# Patient Record
Sex: FEMALE | Race: WHITE | URBAN - METROPOLITAN AREA
[De-identification: names, ages, dates, MRNs, and addresses within clinical notes are randomized per-mention and may not be internally consistent; named-entity substitution may affect disease eponyms.]

---

## 2022-03-22 ENCOUNTER — OFFICE VISIT (OUTPATIENT)
Dept: OBGYN CLINIC | Facility: CLINIC | Age: 37
End: 2022-03-22
Payer: COMMERCIAL

## 2022-03-22 VITALS
WEIGHT: 293 LBS | RESPIRATION RATE: 16 BRPM | BODY MASS INDEX: 48.82 KG/M2 | DIASTOLIC BLOOD PRESSURE: 70 MMHG | SYSTOLIC BLOOD PRESSURE: 124 MMHG | HEIGHT: 65 IN | HEART RATE: 78 BPM

## 2022-03-22 DIAGNOSIS — Z01.419 WELL WOMAN EXAM WITH ROUTINE GYNECOLOGICAL EXAM: Primary | ICD-10-CM

## 2022-03-22 DIAGNOSIS — Z12.4 CERVICAL CANCER SCREENING: ICD-10-CM

## 2022-03-22 PROCEDURE — 3078F DIAST BP <80 MM HG: CPT | Performed by: NURSE PRACTITIONER

## 2022-03-22 PROCEDURE — 3008F BODY MASS INDEX DOCD: CPT | Performed by: NURSE PRACTITIONER

## 2022-03-22 PROCEDURE — 99385 PREV VISIT NEW AGE 18-39: CPT | Performed by: NURSE PRACTITIONER

## 2022-03-22 PROCEDURE — 87624 HPV HI-RISK TYP POOLED RSLT: CPT | Performed by: NURSE PRACTITIONER

## 2022-03-22 PROCEDURE — 3074F SYST BP LT 130 MM HG: CPT | Performed by: NURSE PRACTITIONER

## 2022-03-22 RX ORDER — MULTIVIT-MIN/IRON FUM/FOLIC AC 7.5 MG-4
1 TABLET ORAL DAILY
COMMUNITY

## 2022-03-22 RX ORDER — ALBUTEROL SULFATE 90 UG/1
2 AEROSOL, METERED RESPIRATORY (INHALATION) EVERY 6 HOURS PRN
COMMUNITY

## 2022-04-04 LAB — HPV I/H RISK 1 DNA SPEC QL NAA+PROBE: NEGATIVE

## 2023-04-03 ENCOUNTER — OFFICE VISIT (OUTPATIENT)
Dept: FAMILY MEDICINE CLINIC | Facility: CLINIC | Age: 38
End: 2023-04-03
Payer: COMMERCIAL

## 2023-04-03 ENCOUNTER — LAB ENCOUNTER (OUTPATIENT)
Dept: LAB | Age: 38
End: 2023-04-03
Attending: FAMILY MEDICINE
Payer: COMMERCIAL

## 2023-04-03 ENCOUNTER — HOSPITAL ENCOUNTER (OUTPATIENT)
Dept: ULTRASOUND IMAGING | Age: 38
Discharge: HOME | End: 2023-04-03
Attending: FAMILY MEDICINE
Payer: COMMERCIAL

## 2023-04-03 VITALS
OXYGEN SATURATION: 98 % | RESPIRATION RATE: 16 BRPM | HEIGHT: 65 IN | DIASTOLIC BLOOD PRESSURE: 100 MMHG | BODY MASS INDEX: 48.82 KG/M2 | HEART RATE: 83 BPM | WEIGHT: 293 LBS | TEMPERATURE: 98 F | SYSTOLIC BLOOD PRESSURE: 132 MMHG

## 2023-04-03 DIAGNOSIS — Z00.00 WELLNESS EXAMINATION: ICD-10-CM

## 2023-04-03 DIAGNOSIS — M79.89 LEFT LEG SWELLING: ICD-10-CM

## 2023-04-03 DIAGNOSIS — Z00.00 LABORATORY EXAMINATION ORDERED AS PART OF A COMPLETE PHYSICAL EXAMINATION: ICD-10-CM

## 2023-04-03 DIAGNOSIS — Z00.00 WELLNESS EXAMINATION: Primary | ICD-10-CM

## 2023-04-03 DIAGNOSIS — R03.0 FINDING OF ABOVE NORMAL BLOOD PRESSURE: ICD-10-CM

## 2023-04-03 LAB
ALBUMIN SERPL-MCNC: 3.8 G/DL (ref 3.4–5)
ALBUMIN/GLOB SERPL: 1 {RATIO} (ref 1–2)
ALP LIVER SERPL-CCNC: 59 U/L
ALT SERPL-CCNC: 24 U/L
ANION GAP SERPL CALC-SCNC: 3 MMOL/L (ref 0–18)
AST SERPL-CCNC: 18 U/L (ref 15–37)
BASOPHILS # BLD AUTO: 0.06 X10(3) UL (ref 0–0.2)
BASOPHILS NFR BLD AUTO: 0.8 %
BILIRUB SERPL-MCNC: 0.5 MG/DL (ref 0.1–2)
BUN BLD-MCNC: 13 MG/DL (ref 7–18)
CALCIUM BLD-MCNC: 9.5 MG/DL (ref 8.5–10.1)
CHLORIDE SERPL-SCNC: 107 MMOL/L (ref 98–112)
CHOLEST SERPL-MCNC: 183 MG/DL (ref ?–200)
CO2 SERPL-SCNC: 28 MMOL/L (ref 21–32)
CREAT BLD-MCNC: 1.06 MG/DL
DEPRECATED HBV CORE AB SER IA-ACNC: 88.5 NG/ML
EOSINOPHIL # BLD AUTO: 0.83 X10(3) UL (ref 0–0.7)
EOSINOPHIL NFR BLD AUTO: 11.2 %
ERYTHROCYTE [DISTWIDTH] IN BLOOD BY AUTOMATED COUNT: 13 %
EST. AVERAGE GLUCOSE BLD GHB EST-MCNC: 111 MG/DL (ref 68–126)
FASTING PATIENT LIPID ANSWER: YES
FASTING STATUS PATIENT QL REPORTED: YES
GFR SERPLBLD BASED ON 1.73 SQ M-ARVRAT: 69 ML/MIN/1.73M2 (ref 60–?)
GLOBULIN PLAS-MCNC: 3.8 G/DL (ref 2.8–4.4)
GLUCOSE BLD-MCNC: 86 MG/DL (ref 70–99)
HBA1C MFR BLD: 5.5 % (ref ?–5.7)
HCT VFR BLD AUTO: 43.7 %
HDLC SERPL-MCNC: 41 MG/DL (ref 40–59)
HGB BLD-MCNC: 14.4 G/DL
IMM GRANULOCYTES # BLD AUTO: 0.02 X10(3) UL (ref 0–1)
IMM GRANULOCYTES NFR BLD: 0.3 %
IRON SATN MFR SERPL: 26 %
IRON SERPL-MCNC: 107 UG/DL
LDLC SERPL CALC-MCNC: 114 MG/DL (ref ?–100)
LYMPHOCYTES # BLD AUTO: 2.87 X10(3) UL (ref 1–4)
LYMPHOCYTES NFR BLD AUTO: 38.9 %
MCH RBC QN AUTO: 28.5 PG (ref 26–34)
MCHC RBC AUTO-ENTMCNC: 33 G/DL (ref 31–37)
MCV RBC AUTO: 86.5 FL
MONOCYTES # BLD AUTO: 0.5 X10(3) UL (ref 0.1–1)
MONOCYTES NFR BLD AUTO: 6.8 %
NEUTROPHILS # BLD AUTO: 3.1 X10 (3) UL (ref 1.5–7.7)
NEUTROPHILS # BLD AUTO: 3.1 X10(3) UL (ref 1.5–7.7)
NEUTROPHILS NFR BLD AUTO: 42 %
NONHDLC SERPL-MCNC: 142 MG/DL (ref ?–130)
OSMOLALITY SERPL CALC.SUM OF ELEC: 285 MOSM/KG (ref 275–295)
PLATELET # BLD AUTO: 298 10(3)UL (ref 150–450)
POTASSIUM SERPL-SCNC: 4.2 MMOL/L (ref 3.5–5.1)
PROT SERPL-MCNC: 7.6 G/DL (ref 6.4–8.2)
RBC # BLD AUTO: 5.05 X10(6)UL
SODIUM SERPL-SCNC: 138 MMOL/L (ref 136–145)
TIBC SERPL-MCNC: 411 UG/DL (ref 240–450)
TRANSFERRIN SERPL-MCNC: 276 MG/DL (ref 200–360)
TRIGL SERPL-MCNC: 157 MG/DL (ref 30–149)
TSI SER-ACNC: 2.59 MIU/ML (ref 0.36–3.74)
VIT B12 SERPL-MCNC: 314 PG/ML (ref 193–986)
VIT D+METAB SERPL-MCNC: 16.4 NG/ML (ref 30–100)
VLDLC SERPL CALC-MCNC: 27 MG/DL (ref 0–30)
WBC # BLD AUTO: 7.4 X10(3) UL (ref 4–11)

## 2023-04-03 PROCEDURE — 80061 LIPID PANEL: CPT

## 2023-04-03 PROCEDURE — 85025 COMPLETE CBC W/AUTO DIFF WBC: CPT

## 2023-04-03 PROCEDURE — 82728 ASSAY OF FERRITIN: CPT

## 2023-04-03 PROCEDURE — 93971 EXTREMITY STUDY: CPT | Performed by: FAMILY MEDICINE

## 2023-04-03 PROCEDURE — 83550 IRON BINDING TEST: CPT

## 2023-04-03 PROCEDURE — 82306 VITAMIN D 25 HYDROXY: CPT

## 2023-04-03 PROCEDURE — 83540 ASSAY OF IRON: CPT

## 2023-04-03 PROCEDURE — 83036 HEMOGLOBIN GLYCOSYLATED A1C: CPT

## 2023-04-03 PROCEDURE — 82607 VITAMIN B-12: CPT

## 2023-04-03 PROCEDURE — 80053 COMPREHEN METABOLIC PANEL: CPT

## 2023-04-03 PROCEDURE — 84443 ASSAY THYROID STIM HORMONE: CPT

## 2023-04-03 PROCEDURE — 36415 COLL VENOUS BLD VENIPUNCTURE: CPT

## 2023-04-05 ENCOUNTER — PATIENT MESSAGE (OUTPATIENT)
Dept: FAMILY MEDICINE CLINIC | Facility: CLINIC | Age: 38
End: 2023-04-05

## 2023-04-06 RX ORDER — MONTELUKAST SODIUM 10 MG/1
10 TABLET ORAL DAILY
Qty: 90 TABLET | Refills: 3 | Status: SHIPPED | OUTPATIENT
Start: 2023-04-06 | End: 2024-03-31

## 2023-04-17 ENCOUNTER — OFFICE VISIT (OUTPATIENT)
Dept: FAMILY MEDICINE CLINIC | Facility: CLINIC | Age: 38
End: 2023-04-17
Payer: COMMERCIAL

## 2023-04-17 VITALS
WEIGHT: 293 LBS | RESPIRATION RATE: 16 BRPM | DIASTOLIC BLOOD PRESSURE: 82 MMHG | HEIGHT: 65 IN | SYSTOLIC BLOOD PRESSURE: 130 MMHG | TEMPERATURE: 97 F | OXYGEN SATURATION: 99 % | HEART RATE: 77 BPM | BODY MASS INDEX: 48.82 KG/M2

## 2023-04-17 DIAGNOSIS — Z01.30 BLOOD PRESSURE CHECK: Primary | ICD-10-CM

## 2023-04-17 DIAGNOSIS — E55.9 VITAMIN D DEFICIENCY, UNSPECIFIED: ICD-10-CM

## 2023-04-17 DIAGNOSIS — E66.01 CLASS 3 SEVERE OBESITY WITHOUT SERIOUS COMORBIDITY WITH BODY MASS INDEX (BMI) OF 50.0 TO 59.9 IN ADULT, UNSPECIFIED OBESITY TYPE (HCC): ICD-10-CM

## 2023-04-17 PROBLEM — E66.813 CLASS 3 SEVERE OBESITY WITHOUT SERIOUS COMORBIDITY WITH BODY MASS INDEX (BMI) OF 50.0 TO 59.9 IN ADULT: Status: ACTIVE | Noted: 2023-04-17

## 2023-04-17 PROBLEM — E66.813 CLASS 3 SEVERE OBESITY WITHOUT SERIOUS COMORBIDITY WITH BODY MASS INDEX (BMI) OF 50.0 TO 59.9 IN ADULT (HCC): Status: ACTIVE | Noted: 2023-04-17

## 2023-04-17 PROCEDURE — 3079F DIAST BP 80-89 MM HG: CPT | Performed by: FAMILY MEDICINE

## 2023-04-17 PROCEDURE — 99213 OFFICE O/P EST LOW 20 MIN: CPT | Performed by: FAMILY MEDICINE

## 2023-04-17 PROCEDURE — 3008F BODY MASS INDEX DOCD: CPT | Performed by: FAMILY MEDICINE

## 2023-04-17 PROCEDURE — 3075F SYST BP GE 130 - 139MM HG: CPT | Performed by: FAMILY MEDICINE

## 2023-04-17 RX ORDER — ERGOCALCIFEROL 1.25 MG/1
50000 CAPSULE ORAL WEEKLY
Qty: 12 CAPSULE | Refills: 0 | Status: SHIPPED | OUTPATIENT
Start: 2023-04-17 | End: 2023-07-16

## 2023-05-03 ENCOUNTER — OFFICE VISIT (OUTPATIENT)
Dept: OBGYN CLINIC | Facility: CLINIC | Age: 38
End: 2023-05-03
Payer: COMMERCIAL

## 2023-05-03 ENCOUNTER — HOSPITAL ENCOUNTER (OUTPATIENT)
Dept: ULTRASOUND IMAGING | Age: 38
Discharge: HOME | End: 2023-05-03
Attending: NURSE PRACTITIONER
Payer: COMMERCIAL

## 2023-05-03 VITALS
DIASTOLIC BLOOD PRESSURE: 70 MMHG | HEART RATE: 87 BPM | RESPIRATION RATE: 18 BRPM | SYSTOLIC BLOOD PRESSURE: 128 MMHG | WEIGHT: 293 LBS | HEIGHT: 65 IN | BODY MASS INDEX: 48.82 KG/M2

## 2023-05-03 DIAGNOSIS — N91.5 OLIGOMENORRHEA, UNSPECIFIED TYPE: ICD-10-CM

## 2023-05-03 DIAGNOSIS — Z12.4 CERVICAL CANCER SCREENING: ICD-10-CM

## 2023-05-03 DIAGNOSIS — Z01.419 WELL WOMAN EXAM WITH ROUTINE GYNECOLOGICAL EXAM: Primary | ICD-10-CM

## 2023-05-03 PROCEDURE — 3078F DIAST BP <80 MM HG: CPT | Performed by: NURSE PRACTITIONER

## 2023-05-03 PROCEDURE — 3008F BODY MASS INDEX DOCD: CPT | Performed by: NURSE PRACTITIONER

## 2023-05-03 PROCEDURE — 88175 CYTOPATH C/V AUTO FLUID REDO: CPT | Performed by: NURSE PRACTITIONER

## 2023-05-03 PROCEDURE — 99395 PREV VISIT EST AGE 18-39: CPT | Performed by: NURSE PRACTITIONER

## 2023-05-03 PROCEDURE — 87624 HPV HI-RISK TYP POOLED RSLT: CPT | Performed by: NURSE PRACTITIONER

## 2023-05-03 PROCEDURE — 3074F SYST BP LT 130 MM HG: CPT | Performed by: NURSE PRACTITIONER

## 2023-05-03 RX ORDER — CHLORAL HYDRATE 500 MG
CAPSULE ORAL
COMMUNITY
Start: 2023-04-17

## 2023-05-03 RX ORDER — LORATADINE 10 MG/1
TABLET ORAL
COMMUNITY
Start: 2023-04-17

## 2023-05-04 ENCOUNTER — HOSPITAL ENCOUNTER (OUTPATIENT)
Dept: ULTRASOUND IMAGING | Age: 38
Discharge: HOME | End: 2023-05-04
Attending: NURSE PRACTITIONER
Payer: COMMERCIAL

## 2023-05-04 PROCEDURE — 76856 US EXAM PELVIC COMPLETE: CPT | Performed by: NURSE PRACTITIONER

## 2023-05-04 PROCEDURE — 76830 TRANSVAGINAL US NON-OB: CPT | Performed by: NURSE PRACTITIONER

## 2023-05-08 NOTE — PROGRESS NOTES
Contacted patient. Reviewed results and recommendations. Questions answered and patient states understanding.

## 2023-05-09 LAB — HPV I/H RISK 1 DNA SPEC QL NAA+PROBE: NEGATIVE

## 2023-06-26 ENCOUNTER — OFFICE VISIT (OUTPATIENT)
Dept: OBGYN CLINIC | Facility: CLINIC | Age: 38
End: 2023-06-26
Payer: COMMERCIAL

## 2023-06-26 VITALS
BODY MASS INDEX: 48.82 KG/M2 | DIASTOLIC BLOOD PRESSURE: 78 MMHG | SYSTOLIC BLOOD PRESSURE: 122 MMHG | WEIGHT: 293 LBS | HEART RATE: 101 BPM | HEIGHT: 65 IN

## 2023-06-26 DIAGNOSIS — N91.5 OLIGOMENORRHEA, UNSPECIFIED TYPE: ICD-10-CM

## 2023-06-26 DIAGNOSIS — N92.6 IRREGULAR MENSES: ICD-10-CM

## 2023-06-26 DIAGNOSIS — Z01.812 PRE-PROCEDURAL LABORATORY EXAMINATION: Primary | ICD-10-CM

## 2023-06-26 LAB
CONTROL LINE PRESENT WITH A CLEAR BACKGROUND (YES/NO): YES YES/NO
PREGNANCY TEST, URINE: NEGATIVE

## 2023-06-26 PROCEDURE — 88305 TISSUE EXAM BY PATHOLOGIST: CPT | Performed by: NURSE PRACTITIONER

## 2023-06-26 RX ORDER — ACETAMINOPHEN 160 MG
TABLET,DISINTEGRATING ORAL
COMMUNITY
Start: 2023-03-27

## 2023-06-26 NOTE — PROGRESS NOTES
S: Procedure:   Patient was consented for endometrial biopsy. Reviewed risk for infection, bleeding. All questions answered. No menses since April    O:  The speculum was placed and the cervix visualized. The cervix was cleaned with betadine. Tinaculum was placed to anterior lip of cervix. Uterus sounded to approxiamtly 7 cm. Scant to small tissue was obtained. Patient tolerated the procedure well. A/P:  1. Oligomenorrhea, unspecified type  - SURGICAL PATHOLOGY TISSUE; Future  - FSH; Future  - ESTRADIOL; Future  - PROLACTIN; Future    2. Pre-procedural laboratory examination  - URINE PREGNANCY TEST    3. Irregular menses  - FSH; Future  - ESTRADIOL;  Future  - PROLACTIN; Future

## 2023-06-26 NOTE — PATIENT INSTRUCTIONS
[facility] [place]  After Care Instructions for Endometrial Biopsy      Biopsy Results   You will receive a phone call with your biopsy results in 7 business days. If you have not received your results in 7 days, please contact our office. The results of your biopsy will determine if further treatment will be necessary. Bleeding   You may have some light bleeding or blackish clumpy discharge for several days after your biopsy. Restrictions    You should avoid intercourse or tampon use for 1 day after your biopsy. Pain    You may experience mild menstrual cramping after your biopsy. You may use Ibuprofen, Aleve or Tylenol to relieve your discomfort. If you experience severe or persistent pain contact our office. If you have any additional questions, please call us at [phone].

## 2023-06-27 ENCOUNTER — LAB ENCOUNTER (OUTPATIENT)
Dept: LAB | Age: 38
End: 2023-06-27
Attending: NURSE PRACTITIONER
Payer: COMMERCIAL

## 2023-06-27 DIAGNOSIS — N92.6 IRREGULAR MENSES: ICD-10-CM

## 2023-06-27 DIAGNOSIS — N91.5 OLIGOMENORRHEA, UNSPECIFIED TYPE: ICD-10-CM

## 2023-06-27 LAB
ESTRADIOL SERPL-MCNC: 19.8 PG/ML
FSH SERPL-ACNC: 46.9 MIU/ML
PROLACTIN SERPL-MCNC: 9.6 NG/ML

## 2023-06-27 PROCEDURE — 84146 ASSAY OF PROLACTIN: CPT

## 2023-06-27 PROCEDURE — 83001 ASSAY OF GONADOTROPIN (FSH): CPT

## 2023-06-27 PROCEDURE — 82670 ASSAY OF TOTAL ESTRADIOL: CPT

## 2023-06-27 PROCEDURE — 36415 COLL VENOUS BLD VENIPUNCTURE: CPT

## 2023-07-17 ENCOUNTER — OFFICE VISIT (OUTPATIENT)
Dept: FAMILY MEDICINE CLINIC | Facility: CLINIC | Age: 38
End: 2023-07-17
Payer: COMMERCIAL

## 2023-07-17 ENCOUNTER — LAB ENCOUNTER (OUTPATIENT)
Dept: LAB | Age: 38
End: 2023-07-17
Attending: FAMILY MEDICINE
Payer: COMMERCIAL

## 2023-07-17 VITALS
OXYGEN SATURATION: 98 % | SYSTOLIC BLOOD PRESSURE: 124 MMHG | BODY MASS INDEX: 48.82 KG/M2 | HEIGHT: 65 IN | WEIGHT: 293 LBS | TEMPERATURE: 97 F | RESPIRATION RATE: 16 BRPM | HEART RATE: 74 BPM | DIASTOLIC BLOOD PRESSURE: 78 MMHG

## 2023-07-17 DIAGNOSIS — R79.89 LOW VITAMIN D LEVEL: Primary | ICD-10-CM

## 2023-07-17 DIAGNOSIS — R79.89 LOW VITAMIN D LEVEL: ICD-10-CM

## 2023-07-17 LAB — VIT D+METAB SERPL-MCNC: 37.1 NG/ML (ref 30–100)

## 2023-07-17 PROCEDURE — 3074F SYST BP LT 130 MM HG: CPT | Performed by: FAMILY MEDICINE

## 2023-07-17 PROCEDURE — 3078F DIAST BP <80 MM HG: CPT | Performed by: FAMILY MEDICINE

## 2023-07-17 PROCEDURE — 36415 COLL VENOUS BLD VENIPUNCTURE: CPT

## 2023-07-17 PROCEDURE — 3008F BODY MASS INDEX DOCD: CPT | Performed by: FAMILY MEDICINE

## 2023-07-17 PROCEDURE — 82306 VITAMIN D 25 HYDROXY: CPT

## 2023-07-17 PROCEDURE — 99213 OFFICE O/P EST LOW 20 MIN: CPT | Performed by: FAMILY MEDICINE

## 2023-08-10 ENCOUNTER — OFFICE VISIT (OUTPATIENT)
Dept: INTERNAL MEDICINE CLINIC | Facility: CLINIC | Age: 38
End: 2023-08-10
Payer: COMMERCIAL

## 2023-08-10 VITALS
WEIGHT: 293 LBS | SYSTOLIC BLOOD PRESSURE: 134 MMHG | OXYGEN SATURATION: 96 % | RESPIRATION RATE: 18 BRPM | BODY MASS INDEX: 48.82 KG/M2 | HEART RATE: 83 BPM | HEIGHT: 65 IN | DIASTOLIC BLOOD PRESSURE: 80 MMHG

## 2023-08-10 DIAGNOSIS — E78.1 HYPERTRIGLYCERIDEMIA: ICD-10-CM

## 2023-08-10 DIAGNOSIS — E66.01 CLASS 3 SEVERE OBESITY WITH SERIOUS COMORBIDITY AND BODY MASS INDEX (BMI) OF 50.0 TO 59.9 IN ADULT, UNSPECIFIED OBESITY TYPE (HCC): ICD-10-CM

## 2023-08-10 DIAGNOSIS — E88.81 INSULIN RESISTANCE: ICD-10-CM

## 2023-08-10 DIAGNOSIS — E78.2 MIXED HYPERLIPIDEMIA: ICD-10-CM

## 2023-08-10 DIAGNOSIS — Z51.81 ENCOUNTER FOR THERAPEUTIC DRUG LEVEL MONITORING: Primary | ICD-10-CM

## 2023-08-10 PROCEDURE — 99204 OFFICE O/P NEW MOD 45 MIN: CPT | Performed by: PHYSICIAN ASSISTANT

## 2023-08-10 PROCEDURE — 3079F DIAST BP 80-89 MM HG: CPT | Performed by: PHYSICIAN ASSISTANT

## 2023-08-10 PROCEDURE — 3075F SYST BP GE 130 - 139MM HG: CPT | Performed by: PHYSICIAN ASSISTANT

## 2023-08-10 PROCEDURE — 3008F BODY MASS INDEX DOCD: CPT | Performed by: PHYSICIAN ASSISTANT

## 2023-08-10 RX ORDER — DEXAMETHASONE 4 MG/1
TABLET ORAL
COMMUNITY
Start: 2023-07-20

## 2023-12-06 ENCOUNTER — OFFICE VISIT (OUTPATIENT)
Dept: INTERNAL MEDICINE CLINIC | Facility: CLINIC | Age: 38
End: 2023-12-06
Payer: COMMERCIAL

## 2023-12-06 VITALS
SYSTOLIC BLOOD PRESSURE: 142 MMHG | WEIGHT: 293 LBS | RESPIRATION RATE: 18 BRPM | DIASTOLIC BLOOD PRESSURE: 90 MMHG | BODY MASS INDEX: 48.82 KG/M2 | HEIGHT: 65 IN | OXYGEN SATURATION: 95 % | HEART RATE: 82 BPM

## 2023-12-06 DIAGNOSIS — E66.01 CLASS 3 SEVERE OBESITY WITH SERIOUS COMORBIDITY AND BODY MASS INDEX (BMI) OF 50.0 TO 59.9 IN ADULT, UNSPECIFIED OBESITY TYPE (HCC): ICD-10-CM

## 2023-12-06 DIAGNOSIS — E88.819 INSULIN RESISTANCE: ICD-10-CM

## 2023-12-06 DIAGNOSIS — Z51.81 ENCOUNTER FOR THERAPEUTIC DRUG LEVEL MONITORING: Primary | ICD-10-CM

## 2023-12-06 DIAGNOSIS — E78.1 HYPERTRIGLYCERIDEMIA: ICD-10-CM

## 2023-12-06 DIAGNOSIS — E78.2 MIXED HYPERLIPIDEMIA: ICD-10-CM

## 2023-12-06 PROCEDURE — 99214 OFFICE O/P EST MOD 30 MIN: CPT | Performed by: PHYSICIAN ASSISTANT

## 2023-12-06 PROCEDURE — 3077F SYST BP >= 140 MM HG: CPT | Performed by: PHYSICIAN ASSISTANT

## 2023-12-06 PROCEDURE — 3080F DIAST BP >= 90 MM HG: CPT | Performed by: PHYSICIAN ASSISTANT

## 2023-12-06 PROCEDURE — 3008F BODY MASS INDEX DOCD: CPT | Performed by: PHYSICIAN ASSISTANT

## 2023-12-06 RX ORDER — TIRZEPATIDE 2.5 MG/.5ML
2.5 INJECTION, SOLUTION SUBCUTANEOUS WEEKLY
Qty: 2 ML | Refills: 0 | Status: SHIPPED | OUTPATIENT
Start: 2023-12-06

## 2023-12-06 RX ORDER — METFORMIN HYDROCHLORIDE 750 MG/1
750 TABLET, EXTENDED RELEASE ORAL DAILY
Qty: 90 TABLET | Refills: 1 | Status: SHIPPED | OUTPATIENT
Start: 2023-12-06

## 2023-12-10 ENCOUNTER — TELEPHONE (OUTPATIENT)
Dept: INTERNAL MEDICINE CLINIC | Facility: CLINIC | Age: 38
End: 2023-12-10

## 2023-12-11 NOTE — TELEPHONE ENCOUNTER
I TRIED TO ENTER   There was an error verifying eligibility - someone started pa and questions    I tried to renew and cannot

## 2023-12-12 NOTE — TELEPHONE ENCOUNTER
Pa entered in CMM for Zepbound 2.5 mg  Awaiting decision  TATYANA ZELAYA (Key: [id number])    Noted she has tried wegovy and cannot continue d/t shortages and cannot get saxenda d/t shortages  Never tried phentermine.

## 2023-12-14 NOTE — TELEPHONE ENCOUNTER
PA denied from [employer] for Zepbound stating patient must try alternatives.  I can do the notice regarding shortages from FDA (print) and attach - but she has never tried Qsymia or phentermine - is that contraindicated?        Fax received 12/13/23 at 8:03 am

## 2023-12-15 ENCOUNTER — PATIENT MESSAGE (OUTPATIENT)
Dept: INTERNAL MEDICINE CLINIC | Facility: CLINIC | Age: 38
End: 2023-12-15

## 2023-12-18 NOTE — TELEPHONE ENCOUNTER
From: Altagracia Washington  To: Jade Meneses  Sent: 12/15/2023 11:12 AM CST  Subject: Prior Authorization of Zepbound    Hello. My last appt with you was on Weds Dec 6 when I was prescribed Zepbound. I called the pharmacy on Monday and again today. They informed me that it is still awaiting prior authorization from the dr office. Is this something that can be looked into and given so that I can  this medication? Thank you for your help.

## 2024-01-01 NOTE — TELEPHONE ENCOUNTER
Reapplied for coverage of zepbound today on CMM  Noted she cannot take qsymia d/t high blood pressure and shortages of saxenda and wegovy notice from FDA attached  Awaiting decision    TATYANA ZELAYA (Key: [id number])

## 2024-01-11 ENCOUNTER — PATIENT MESSAGE (OUTPATIENT)
Dept: OBGYN CLINIC | Facility: CLINIC | Age: 39
End: 2024-01-11

## 2024-01-19 ENCOUNTER — LAB ENCOUNTER (OUTPATIENT)
Dept: LAB | Age: 39
End: 2024-01-19
Attending: NURSE PRACTITIONER
Payer: COMMERCIAL

## 2024-01-19 DIAGNOSIS — N91.5 OLIGOMENORRHEA, UNSPECIFIED TYPE: ICD-10-CM

## 2024-01-19 DIAGNOSIS — N92.6 IRREGULAR MENSES: ICD-10-CM

## 2024-01-19 LAB
ESTRADIOL SERPL-MCNC: 17.6 PG/ML
FSH SERPL-ACNC: 66.6 MIU/ML

## 2024-01-19 PROCEDURE — 83001 ASSAY OF GONADOTROPIN (FSH): CPT

## 2024-01-19 PROCEDURE — 36415 COLL VENOUS BLD VENIPUNCTURE: CPT

## 2024-01-19 PROCEDURE — 82670 ASSAY OF TOTAL ESTRADIOL: CPT

## 2024-01-31 ENCOUNTER — OFFICE VISIT (OUTPATIENT)
Dept: OBGYN CLINIC | Facility: CLINIC | Age: 39
End: 2024-01-31
Payer: COMMERCIAL

## 2024-01-31 VITALS
HEART RATE: 101 BPM | HEIGHT: 64 IN | BODY MASS INDEX: 50.02 KG/M2 | SYSTOLIC BLOOD PRESSURE: 130 MMHG | WEIGHT: 293 LBS | DIASTOLIC BLOOD PRESSURE: 82 MMHG

## 2024-01-31 DIAGNOSIS — E28.39 PRIMARY OVARIAN INSUFFICIENCY: Primary | ICD-10-CM

## 2024-01-31 PROCEDURE — 3075F SYST BP GE 130 - 139MM HG: CPT | Performed by: OBSTETRICS & GYNECOLOGY

## 2024-01-31 PROCEDURE — 3008F BODY MASS INDEX DOCD: CPT | Performed by: OBSTETRICS & GYNECOLOGY

## 2024-01-31 PROCEDURE — 99213 OFFICE O/P EST LOW 20 MIN: CPT | Performed by: OBSTETRICS & GYNECOLOGY

## 2024-01-31 PROCEDURE — 3079F DIAST BP 80-89 MM HG: CPT | Performed by: OBSTETRICS & GYNECOLOGY

## 2024-01-31 RX ORDER — LEVONORGESTREL AND ETHINYL ESTRADIOL 0.15-0.03
1 KIT ORAL DAILY
Qty: 91 TABLET | Refills: 4 | Status: SHIPPED | OUTPATIENT
Start: 2024-01-31 | End: 2025-01-30

## 2024-01-31 NOTE — PROGRESS NOTES
[facility]  Obstetrics and Gynecology    Subjective:     Soo Pollack is a 38 year old  who presents to review lab results, done because of irregular menstrual cycles, possible perimenopause.  She has not had a menstrual cycle in over a year - maybe was 2022.   Denies significant symptoms, has always been a \"hot sleeper\", maybe this has gotten a little worse. Denies hot flushes or genitourinary symptoms.     OB History    Para Term  AB Living   3 2 2   1 2   SAB IAB Ectopic Multiple Live Births   1       2      # Outcome Date GA Lbr Foster/2nd Weight Sex Delivery Anes PTL Lv   3 Term 18    F Caesarean   JOHN   2 Term 12    M Caesarean EPI  JOHN   1 SAB  10w0d    SAB   FD       Period Cycle (Days): does not have cycle (2024  8:10 AM)  Period Duration (Days): 3-5 (5/3/2023  8:53 AM)  Period Flow: light (5/3/2023  8:53 AM)  Use of Birth Control (if yes, specify type): None (2024  8:10 AM)  Hx Prior Abnormal Pap: No (2024  8:10 AM)  Pap Date: 23 (2024  8:10 AM)  Pap Result Notes: normal (2024  8:10 AM)       Objective:     Vitals:    24 0742   BP: 130/82   Pulse: 101   Weight: (!) 309 lb (140.2 kg)   Height: 64\"       Body mass index is 53.04 kg/m².    GEN: AAOx3, NAD, appears well, appears stated age  RESP: breathing comfortably      Labs:   Latest Reference Range & Units 23 08:39 23 08:28 24 11:51   TSH 0.358 - 3.740 mIU/mL 2.590     Prolactin 2.2 - 31.5 ng/mL  9.6    Estradiol No established range for female sex pg/mL  19.8 17.6   FSH No established range for female sex mIU/mL  46.9 66.6     Endometrial biopsy:  -Few fragments of weakly proliferative endometrium with blood.  -Negative for hyperplasia or malignancy.    Imaging:  Pelvic US 5/4/23  FINDINGS:                UTERUS:  10.61 cm x 3.99 cm x 5.37 cm     Endometrium Thickness: 0.75 cm     The uterus appears normal in size, shape, and echogenicity.    RIGHT OVARY:  Nonvisualization.    LEFT OVARY:  2.33 cm x 2.46 cm x 2.35 cm     The left ovary appears normal in size, shape, and echogenicity. No significant masses are identified.   CUL-DE-SAC:  Normal.  No fluid or mass.    OTHER:  Trace amount of fluid is noted in the cervix.  Small to both the insists seen.       Assessment:     Soo Pollack is a 38 year old  with secondary amenorrhea for over a year and labs consistent with menopause - suspect primary ovarian insufficiency/premature ovarian failure.     Plan:     Primary ovarian insufficiency/premature ovarian failure is when a woman reaches menopause prior to 41yo. Counseled that the cause of this can be from abnormal karyotype such as 45 XO (Velásquez Syndrome) or Fragile X mutation, recommend testing. She is at risk for c-morbidities related to menopause, which can be higher risk to her as it is starting early. Therefore she should have regular care with PCP monitor her cardiovascular and bone health.     Menopausal hormone therapy (MHT) can reduce the risk of osteoporosis and CV disease, as well as helping with vasomotor and genitourinary symptoms of menopause. Recommend combined OCP as this will provide contraception as well in the case that spontaneous ovarian activity resumes. Advised that she may experience \"periods\" from withdrawal bleeding from the OCP - will rx 90 day continuous use pill to limit bleeding.     -- Follow up for annual or sooner as needed    Total time was 25 minutes, more than 50% of the time was spent in face-to-face counseling and/or coordination of care.      Dana Pena MD  [facility]  2024 8:03 AM

## 2024-02-09 PROBLEM — E28.39 PRIMARY OVARIAN INSUFFICIENCY: Status: ACTIVE | Noted: 2024-02-09

## 2024-02-16 ENCOUNTER — PATIENT MESSAGE (OUTPATIENT)
Dept: INTERNAL MEDICINE CLINIC | Facility: CLINIC | Age: 39
End: 2024-02-16

## 2024-02-20 NOTE — TELEPHONE ENCOUNTER
From: Soo Pollack  To: Pippa Edgar  Sent: 2/16/2024 1:39 PM CST  Subject: Zepbound 5mg/0.5ml progress    Hello.     I have just had my third shot of the Zepbound 5mg/0.5ml last night. In a prior message you asked me to provide an update after the third dose. Things seem to be going well with it. There is definitely a difference with how full I feel now on this dosage as opposed to the first dose.     I only have one shot left. Should I continue on this dose? If so I believe I will still need a new prescription as it currently shows that I have 0 refills available. Thank you for your help.

## 2024-02-21 NOTE — TELEPHONE ENCOUNTER
Pt is requesting next dose of Zepbound. Has taken 3 weeks of 5.0mg dose without S/E. She feels good on it and states current weight is 299#.     Pended 7.5mg if agreeable.     Thank you

## 2024-02-27 RX ORDER — TIRZEPATIDE 7.5 MG/.5ML
7.5 INJECTION, SOLUTION SUBCUTANEOUS WEEKLY
Qty: 2 ML | Refills: 0 | Status: SHIPPED | OUTPATIENT
Start: 2024-02-27

## 2024-02-27 NOTE — TELEPHONE ENCOUNTER
Per Pippa anthony to order 7.5 mg zepbound for patient.    Future Appointments   Date Time Provider Department Center   3/7/2024 12:20 PM Pippa Edgar, PA-C [facility] [address]

## 2024-02-29 ENCOUNTER — PATIENT MESSAGE (OUTPATIENT)
Dept: OBGYN CLINIC | Facility: CLINIC | Age: 39
End: 2024-02-29

## 2024-03-01 NOTE — TELEPHONE ENCOUNTER
From: Soo Pollack  To: Dana Arguelles  Sent: 2/29/2024 1:57 PM CST  Subject: Period On Birth Control    Hello. I saw you last on Jan 31 and you started me on Levonor Estradiol for early menopause. You said that it may trigger me to start my period - which it did. My period started on Monday, February 19 and continues through today. It is not a heavy flow - but it is still present after 10 days and doesn’t appear to be lessening any. Is this a concern to you? When I was getting my period it was light and short - 3 to 4 days and done. This is a little worrying to me that I continue to bleed after this long. Please let me know if this is normal or if there is something that I need to do. Thank you.

## 2024-03-07 ENCOUNTER — OFFICE VISIT (OUTPATIENT)
Dept: INTERNAL MEDICINE CLINIC | Facility: CLINIC | Age: 39
End: 2024-03-07
Payer: COMMERCIAL

## 2024-03-07 VITALS
BODY MASS INDEX: 50.02 KG/M2 | OXYGEN SATURATION: 97 % | HEART RATE: 92 BPM | SYSTOLIC BLOOD PRESSURE: 118 MMHG | HEIGHT: 64 IN | RESPIRATION RATE: 16 BRPM | WEIGHT: 293 LBS | DIASTOLIC BLOOD PRESSURE: 70 MMHG

## 2024-03-07 DIAGNOSIS — E78.2 MIXED HYPERLIPIDEMIA: ICD-10-CM

## 2024-03-07 DIAGNOSIS — E55.9 VITAMIN D DEFICIENCY: ICD-10-CM

## 2024-03-07 DIAGNOSIS — Z51.81 ENCOUNTER FOR THERAPEUTIC DRUG LEVEL MONITORING: Primary | ICD-10-CM

## 2024-03-07 DIAGNOSIS — E66.01 CLASS 3 SEVERE OBESITY WITH SERIOUS COMORBIDITY AND BODY MASS INDEX (BMI) OF 50.0 TO 59.9 IN ADULT, UNSPECIFIED OBESITY TYPE (HCC): ICD-10-CM

## 2024-03-07 DIAGNOSIS — E88.819 INSULIN RESISTANCE: ICD-10-CM

## 2024-03-07 PROCEDURE — 3008F BODY MASS INDEX DOCD: CPT | Performed by: PHYSICIAN ASSISTANT

## 2024-03-07 PROCEDURE — 3074F SYST BP LT 130 MM HG: CPT | Performed by: PHYSICIAN ASSISTANT

## 2024-03-07 PROCEDURE — 3078F DIAST BP <80 MM HG: CPT | Performed by: PHYSICIAN ASSISTANT

## 2024-03-07 PROCEDURE — 99214 OFFICE O/P EST MOD 30 MIN: CPT | Performed by: PHYSICIAN ASSISTANT

## 2024-03-07 NOTE — PROGRESS NOTES
HISTORY OF PRESENT ILLNESS  Chief Complaint   Patient presents with    Weight Check     -20lbs       Soo Pollack is a 38 year old female here for follow up in medical weight loss program.   -20lbs  Compliant on zepbound and metformin  Denies chest pain, shortness of breath, dizziness, blurred vision, headache, paresthesia, nausea/vomiting.   Struggling a little more this week with getting protein in because of lack of appetite  Prior to this was doing well with protein  Feeling full sooner  Kids were sick and then her and  got sick  But now starting to get back into exercise, got a small trampoline basement    Nutrition: 24 hour food log reviewed, eating regular meals, +protein  Stress: 6/10  Sleep: 4 hours/night, child wakes her up once a night, quick to fall back asleep      Wt Readings from Last 6 Encounters:   03/07/24 293 lb (132.9 kg)   01/31/24 (!) 309 lb (140.2 kg)   12/06/23 (!) 313 lb (142 kg)   08/10/23 (!) 347 lb (157.4 kg)   07/17/23 (!) 351 lb (159.2 kg)   06/26/23 (!) 348 lb (157.9 kg)            Breakfast Lunch Dinner Snacks Fluids   Reviewed           REVIEW OF SYSTEMS  GENERAL HEALTH: feels well otherwise, denied any fevers chills or night sweats   RESPIRATORY: denies shortness of breath   CARDIOVASCULAR: denies chest pain  GI: denies abdominal pain    EXAM  /70   Pulse 92   Resp 16   Ht 5' 4\" (1.626 m)   Wt 293 lb (132.9 kg)   LMP 02/18/2024 (Approximate)   SpO2 97%   BMI 50.29 kg/m²   GENERAL: well developed, well nourished,in no apparent distress, A/O x3  SKIN: no rashes,no suspicious lesions  HEENT: atraumatic, normocephalic, OP-clear, PERRL  NECK: supple,no adenopathy  LUNGS: clear to auscultation bilaterally   CARDIO: RRR without murmur  GI: good BS's,NT/ND, no masses or HSM  EXTREMITIES: no cyanosis, no clubbing, no edema    Lab Results   Component Value Date    WBC 7.4 04/03/2023    RBC 5.05 04/03/2023    HGB 14.4 04/03/2023    HCT 43.7 04/03/2023    MCV 86.5  04/03/2023    MCH 28.5 04/03/2023    MCHC 33.0 04/03/2023    RDW 13.0 04/03/2023    .0 04/03/2023     Lab Results   Component Value Date    GLU 86 04/03/2023    BUN 13 04/03/2023    CREATSERUM 1.06 (H) 04/03/2023    ANIONGAP 3 04/03/2023    CA 9.5 04/03/2023    OSMOCALC 285 04/03/2023    ALKPHO 59 04/03/2023    AST 18 04/03/2023    ALT 24 04/03/2023    BILT 0.5 04/03/2023    TP 7.6 04/03/2023    ALB 3.8 04/03/2023    GLOBULIN 3.8 04/03/2023     04/03/2023    K 4.2 04/03/2023     04/03/2023    CO2 28.0 04/03/2023     Lab Results   Component Value Date     04/03/2023    A1C 5.5 04/03/2023     Lab Results   Component Value Date    CHOLEST 183 04/03/2023    TRIG 157 (H) 04/03/2023    HDL 41 04/03/2023     (H) 04/03/2023    VLDL 27 04/03/2023    NONHDLC 142 (H) 04/03/2023     Lab Results   Component Value Date    TSH 2.590 04/03/2023     Lab Results   Component Value Date    B12 314 04/03/2023     Lab Results   Component Value Date    VITD 37.1 07/17/2023       Current Outpatient Medications on File Prior to Visit   Medication Sig Dispense Refill    Tirzepatide-Weight Management (ZEPBOUND) 7.5 MG/0.5ML Subcutaneous Solution Auto-injector Inject 7.5 mg into the skin once a week. 2 mL 0    Levonorgest-Eth Estrad 91-Day 0.15-0.03 MG Oral Tab Take 1 tablet by mouth daily. 91 tablet 4    metFORMIN  MG Oral Tablet 24 Hr Take 1 tablet (750 mg total) by mouth daily. With a meal 90 tablet 1    Biotin w/ Vitamins C & E (HAIR/SKIN/NAILS OR) Take by mouth.      cholecalciferol 50 MCG (2000 UT) Oral Cap       loratadine (CLARITIN) 10 MG Oral Tab       omega-3 fatty acids 1000 MG Oral Cap       montelukast (SINGULAIR) 10 MG Oral Tab Take 1 tablet (10 mg total) by mouth daily. 90 tablet 3    Multiple Vitamins-Minerals (MULTI-VITAMIN/MINERALS) Oral Tab Take 1 tablet by mouth daily.      FLOVENT  MCG/ACT Inhalation Aerosol  (Patient not taking: Reported on 3/7/2024)      albuterol 108 (90  Base) MCG/ACT Inhalation Aero Soln Inhale 2 puffs into the lungs every 6 (six) hours as needed for Wheezing. (Patient not taking: Reported on 3/7/2024)       No current facility-administered medications on file prior to visit.       ASSESSMENT  Analyzed weight data:       Diagnoses and all orders for this visit:    Encounter for therapeutic drug level monitoring    Class 3 severe obesity with serious comorbidity and body mass index (BMI) of 50.0 to 59.9 in adult, unspecified obesity type (HCC)    Mixed hyperlipidemia    Insulin resistance    Vitamin D deficiency        PLAN  Initial Weight: 347 lbs  Initial Weight Date: 08/10/23  Today's Weight: 293 lbs  5% Goal: 17.35  10% Goal: 34.7  Total Weight Loss: -20lbs/Net loss 54 lbs    Will continue metformin and zepbound - advised side effects and adverse effects of medication   Insulin resistance - continue current medications, low carb diet  HLD - lifestyle changes  Vit D supplement, take with food  Consistency with logging foods - protein and produce  Increase exercise, incorporate strength/resistance training  Nutrition: low carb diet/ recommended to eat breakfast daily/ regular protein intake  Medication use and side effects reviewed with patient.  Medication contraindications: EKG prior to stimulant  Follow up with dietitian and psychologist as recommended.  Discussed the role of sleep and stress in weight management.  Counseled on comprehensive weight loss plan including attention to nutrition, exercise and behavior/stress management for success. See patient instruction below for more details.  Discussed strategies to overcome barriers to successful weight loss and weight maintenance  FITTE: ACSM recommendations (150-300 minutes/ week in active weight loss)   Total time spent on chart review, pre-charting, obtaining history, counseling, and educating, reviewing labs was 30 minutes.  Weight Loss consent to treat reviewed and signed       NOTE TO PATIENT: The 21st  Century Cures Act makes clinical notes like these available to patients in the interest of transparency. Clinical notes are medical documents used by physicians and care providers to communicate with each other. These documents include medical language and terminology, abbreviations, and treatment information that may sound technical and at times possibly unfamiliar. In addition, at times, the verbiage may appear blunt or direct. These documents are one tool providers use to communicate relevant information and clinical opinions of the care providers in a way that allows common understanding of the clinical context.     There are no Patient Instructions on file for this visit.    No follow-ups on file.    Patient verbalizes understanding.    Pippa Edgar PA-C  3/7/2024

## 2024-03-14 ENCOUNTER — PATIENT MESSAGE (OUTPATIENT)
Dept: INTERNAL MEDICINE CLINIC | Facility: CLINIC | Age: 39
End: 2024-03-14

## 2024-03-15 NOTE — TELEPHONE ENCOUNTER
From: Soo Pollack  To: Pippa Edgar  Sent: 3/14/2024 7:04 PM CDT  Subject: Zepbound 7.5    Hello. I had my appointment with Pippa last Thursday. I informed her that on the 7.5 dose I have very little appetite. While it is getting somewhat better I feel that I should stick on this dose for one more month. Can you please send another prescription for Zepbound 7.5 to the pharmacy? I currently have no refills available. Thank you.

## 2024-03-15 NOTE — TELEPHONE ENCOUNTER
Pt would like refill. Is asking to stay on 7.5mg dose for this time.     LOV 3/7/24  Future Appointments   Date Time Provider Department Center   5/7/2024 12:30 PM Dana Arguelles MD [facility] [address]   6/27/2024  2:40 PM Pippa Edgar PA-C [facility] [address]   Last Rx 2/27/24 #2mL + 0     Pended if agreeable.

## 2024-03-18 RX ORDER — TIRZEPATIDE 7.5 MG/.5ML
7.5 INJECTION, SOLUTION SUBCUTANEOUS WEEKLY
Qty: 2 ML | Refills: 0 | Status: SHIPPED | OUTPATIENT
Start: 2024-03-18

## 2024-03-26 ENCOUNTER — PATIENT MESSAGE (OUTPATIENT)
Dept: INTERNAL MEDICINE CLINIC | Facility: CLINIC | Age: 39
End: 2024-03-26

## 2024-03-26 NOTE — TELEPHONE ENCOUNTER
From: Soo Pollack  To: Pippa Edgar  Sent: 3/26/2024 12:02 PM CDT  Subject: Zepbound 7.5 mg Out Of Stock    Hello,     My current prescription goes to [facility] and I am being told that right now they are out of stock of the Zepbound 7.5 mg with a possible stock date of April sometime. I have called 5 other pharmacies in the area and they all say something similar - nothing in stock now and no stock date until possibly April. I currently have 0 shots to give myself as I took my last shot last Thursday. What should I do in the meantime until pharmacies are stocked again?     Thank you.

## 2024-03-27 RX ORDER — TIRZEPATIDE 10 MG/.5ML
10 INJECTION, SOLUTION SUBCUTANEOUS WEEKLY
Qty: 2 ML | Refills: 0 | Status: SHIPPED | OUTPATIENT
Start: 2024-03-27

## 2024-04-12 RX ORDER — TIRZEPATIDE 10 MG/.5ML
10 INJECTION, SOLUTION SUBCUTANEOUS WEEKLY
Qty: 2 ML | Refills: 0 | Status: CANCELLED | OUTPATIENT
Start: 2024-04-12

## 2024-04-12 NOTE — TELEPHONE ENCOUNTER
Patient has had some nausea on the 10mg that has mellowed out and is okay with going up in the dose if approved.    Requesting   Requested Prescriptions     Pending Prescriptions Disp Refills    Tirzepatide-Weight Management (ZEPBOUND) 12.5 MG/0.5ML Subcutaneous Solution Auto-injector 2 mL 0     Sig: Inject 12.5 mg into the skin once a week for 4 doses.      LOV: 3/7/24  RTC:   Last Relevant Labs:   Filled: 3/27/24 #2ml with 0 refills    Future Appointments   Date Time Provider Department Center   4/16/2024 12:40 PM Bryn Ennis APRN [facility] [address]   5/7/2024 12:30 PM Dana Arguelles MD [facility] [address]   6/27/2024  2:40 PM Pippa Edgar, ATA [facility] [address]

## 2024-04-14 RX ORDER — TIRZEPATIDE 12.5 MG/.5ML
12.5 INJECTION, SOLUTION SUBCUTANEOUS WEEKLY
Qty: 2 ML | Refills: 0 | Status: SHIPPED | OUTPATIENT
Start: 2024-04-14 | End: 2024-05-06

## 2024-04-16 ENCOUNTER — OFFICE VISIT (OUTPATIENT)
Dept: FAMILY MEDICINE CLINIC | Facility: CLINIC | Age: 39
End: 2024-04-16
Payer: COMMERCIAL

## 2024-04-16 VITALS
OXYGEN SATURATION: 99 % | SYSTOLIC BLOOD PRESSURE: 132 MMHG | HEART RATE: 87 BPM | HEIGHT: 64 IN | BODY MASS INDEX: 48.76 KG/M2 | TEMPERATURE: 98 F | RESPIRATION RATE: 18 BRPM | WEIGHT: 285.63 LBS | DIASTOLIC BLOOD PRESSURE: 82 MMHG

## 2024-04-16 DIAGNOSIS — J30.9 ALLERGIC RHINITIS, UNSPECIFIED SEASONALITY, UNSPECIFIED TRIGGER: ICD-10-CM

## 2024-04-16 DIAGNOSIS — R53.83 OTHER FATIGUE: ICD-10-CM

## 2024-04-16 DIAGNOSIS — E55.9 VITAMIN D DEFICIENCY, UNSPECIFIED: ICD-10-CM

## 2024-04-16 DIAGNOSIS — Z00.00 WELLNESS EXAMINATION: Primary | ICD-10-CM

## 2024-04-16 DIAGNOSIS — Z12.31 ENCOUNTER FOR SCREENING MAMMOGRAM FOR MALIGNANT NEOPLASM OF BREAST: ICD-10-CM

## 2024-04-16 DIAGNOSIS — Z00.00 LABORATORY EXAMINATION ORDERED AS PART OF A COMPLETE PHYSICAL EXAMINATION: ICD-10-CM

## 2024-04-16 PROCEDURE — 3008F BODY MASS INDEX DOCD: CPT | Performed by: FAMILY MEDICINE

## 2024-04-16 PROCEDURE — 96127 BRIEF EMOTIONAL/BEHAV ASSMT: CPT | Performed by: FAMILY MEDICINE

## 2024-04-16 PROCEDURE — 3079F DIAST BP 80-89 MM HG: CPT | Performed by: FAMILY MEDICINE

## 2024-04-16 PROCEDURE — 99396 PREV VISIT EST AGE 40-64: CPT | Performed by: FAMILY MEDICINE

## 2024-04-16 PROCEDURE — 3075F SYST BP GE 130 - 139MM HG: CPT | Performed by: FAMILY MEDICINE

## 2024-04-16 RX ORDER — MONTELUKAST SODIUM 10 MG/1
10 TABLET ORAL NIGHTLY
COMMUNITY

## 2024-04-16 NOTE — PROGRESS NOTES
Subjective:   Soo Pollack is a 38 year old female who presents for annual physical exam.      Patient to clinic for annual physical examination. Patient states that she is concerned about fatigue. For the last few months she has been very fatigued throughout the day. Even with daily exercise of 2 mile walk she feels very tired and has difficulty getting up in the morning to do her exercise. Patient would like to get lab work done for annual health screening.       Immunization: Patient states that she had her TDAP with her last pregnancy in 2018, does not remember where she received the vaccine. Does not wish to have covid or flu at this time. Patient stated \"yes I know I am late on those this year.\"  Mammo: Schedule for May, order placed.   PAP: UTD sees OB/GYN regularly for annual visits.   Colon: Not needed no family history of CRC at this time, not screening age.     History/Other:    No Further Nursing Notes to Review  Tobacco Reviewed  Allergies   Reviewed  Medications Reviewed  Problem List Reviewed  Medical History   Reviewed  Surgical History Reviewed  OB Status Reviewed  Family History   Reviewed  Social History Reviewed       I had reviewed past medical and family histories together with allergy and medication lists documented.  Tobacco:  She has never smoked tobacco.    Current Outpatient Medications   Medication Sig Dispense Refill    montelukast 10 MG Oral Tab Take 1 tablet (10 mg total) by mouth nightly.      Tirzepatide-Weight Management (ZEPBOUND) 10 MG/0.5ML Subcutaneous Solution Auto-injector Inject 10 mg into the skin once a week. 2 mL 0    Levonorgest-Eth Estrad 91-Day 0.15-0.03 MG Oral Tab Take 1 tablet by mouth daily. 91 tablet 4    metFORMIN  MG Oral Tablet 24 Hr Take 1 tablet (750 mg total) by mouth daily. With a meal 90 tablet 1    Biotin w/ Vitamins C & E (HAIR/SKIN/NAILS OR) Take by mouth.      cholecalciferol 50 MCG (2000 UT) Oral Cap       loratadine (CLARITIN) 10  MG Oral Tab       omega-3 fatty acids 1000 MG Oral Cap       Multiple Vitamins-Minerals (MULTI-VITAMIN/MINERALS) Oral Tab Take 1 tablet by mouth daily.      Tirzepatide-Weight Management (ZEPBOUND) 12.5 MG/0.5ML Subcutaneous Solution Auto-injector Inject 12.5 mg into the skin once a week for 4 doses. 2 mL 0    FLOVENT  MCG/ACT Inhalation Aerosol  (Patient not taking: Reported on 3/7/2024)      albuterol 108 (90 Base) MCG/ACT Inhalation Aero Soln Inhale 2 puffs into the lungs every 6 (six) hours as needed for Wheezing. (Patient not taking: Reported on 3/7/2024)           Review of Systems:  Review of Systems   Constitutional:  Positive for fatigue. Negative for activity change, appetite change, fever and unexpected weight change (actively loosing weight, with medication).   HENT: Negative.  Negative for postnasal drip and rhinorrhea.    Eyes:  Positive for redness and itching.   Respiratory: Negative.  Negative for cough, shortness of breath and wheezing.    Cardiovascular:  Positive for leg swelling (left leg swells more than right leg, previously addressed per patient). Negative for chest pain and palpitations.   Gastrointestinal:  Positive for nausea (with increase in dosage of zepbound). Negative for abdominal distention, abdominal pain, diarrhea, rectal pain and vomiting.   Endocrine: Negative.    Genitourinary: Negative.    Musculoskeletal:  Positive for myalgias (Minor neck pain, muscle stiffness.).   Skin: Negative.    Allergic/Immunologic: Positive for environmental allergies.   Neurological: Negative.    Psychiatric/Behavioral:  Positive for sleep disturbance (son wakes her up once a night.).      Objective:   /82 (BP Location: Left arm, Patient Position: Sitting, Cuff Size: large)   Pulse 87   Temp 97.7 °F (36.5 °C) (Temporal)   Resp 18   Ht 5' 4\" (1.626 m)   Wt 285 lb 9.6 oz (129.5 kg)   LMP 04/16/2024 (Approximate)   SpO2 99%   BMI 49.02 kg/m²  Estimated body mass index is 49.02  kg/m² as calculated from the following:    Height as of this encounter: 5' 4\" (1.626 m).    Weight as of this encounter: 285 lb 9.6 oz (129.5 kg).  Physical Exam  Constitutional:       General: She is not in acute distress.     Appearance: Normal appearance. She is well-developed. She is obese. She is not ill-appearing, toxic-appearing or diaphoretic.   HENT:      Head: Normocephalic and atraumatic.      Right Ear: No decreased hearing noted. No tenderness. A middle ear effusion is present. Tympanic membrane is bulging. Tympanic membrane is not injected or erythematous.      Left Ear: No decreased hearing noted. No tenderness. A middle ear effusion is present. Tympanic membrane is bulging. Tympanic membrane is not injected or erythematous.      Nose: Nose normal. No congestion or rhinorrhea.      Mouth/Throat:      Mouth: Mucous membranes are moist.      Pharynx: Oropharynx is clear. Posterior oropharyngeal erythema (Post nasal drip) present. No pharyngeal swelling or oropharyngeal exudate.   Eyes:      General: Allergic shiner present.         Right eye: No discharge.         Left eye: No discharge.      Conjunctiva/sclera:      Right eye: Right conjunctiva is injected (slight allergy related.).      Left eye: Left conjunctiva is injected (slight, allergy related).   Cardiovascular:      Rate and Rhythm: Normal rate and regular rhythm.      Heart sounds: Normal heart sounds.   Pulmonary:      Effort: Pulmonary effort is normal.   Skin:     General: Skin is warm and dry.   Neurological:      General: No focal deficit present.      Mental Status: She is alert and oriented to person, place, and time.   Psychiatric:         Mood and Affect: Mood normal.         Behavior: Behavior normal.         Thought Content: Thought content normal.         Judgment: Judgment normal.         Assessment & Plan:   1. Wellness examination (Primary)  Discussion about general health and wellness screening.   Patient is agreeable to  getting lab work done to evaluate and monitor as part of screening and prevention of health issues.  Discussion about general diet and increasing activity.  Discussion about taking daily multivitamin.   Will discuss any abnormal values if they arise.  2. Laboratory examination ordered as part of a complete physical examination  -     CBC With Differential With Platelet; Future; Expected date: 04/16/2024  -     Comp Metabolic Panel (14); Future; Expected date: 04/16/2024  -     Lipid Panel; Future; Expected date: 04/16/2024  -     TSH W Reflex To Free T4; Future; Expected date: 04/16/2024  3. Encounter for screening mammogram for malignant neoplasm of breast  -     [facility] EZEQUIEL 2D+3D SCREENING BILAT (CPT=77067/44080); Future; Expected date: 04/16/2024  4. Vitamin D deficiency, unspecified  -     Vitamin D; Future; Expected date: 04/16/2024    5. Other fatigue  Ddx: Vitamin d deficiency, sleep disturbance, sleep apnea (declined sleep study), stress related, assessing for possible SANA.   -     Vitamin D; Future; Expected date: 04/16/2024  -     Vitamin B12; Future; Expected date: 04/16/2024  -     Iron And Tibc; Future; Expected date: 04/16/2024  6. Allergic rhinitis, unspecified seasonality, unspecified trigger  Continue daily antihistamine: Claritin, Zyrtec, Allegra are all good options. Generic is fine.  Use nasal spray daily flonase or astepro  Avoid allergy triggers  Follow up if symptoms worsen or new onset of fever    Follow Up:  Follow up as needed for preventive care and for medication refills.      YG Reese, 4/16/2024, 1:03 PM

## 2024-04-17 ENCOUNTER — LAB ENCOUNTER (OUTPATIENT)
Dept: LAB | Age: 39
End: 2024-04-17
Attending: FAMILY MEDICINE
Payer: COMMERCIAL

## 2024-04-17 DIAGNOSIS — R53.83 OTHER FATIGUE: ICD-10-CM

## 2024-04-17 DIAGNOSIS — Z00.00 LABORATORY EXAMINATION ORDERED AS PART OF A COMPLETE PHYSICAL EXAMINATION: ICD-10-CM

## 2024-04-17 DIAGNOSIS — E55.9 VITAMIN D DEFICIENCY, UNSPECIFIED: ICD-10-CM

## 2024-04-17 LAB
ALBUMIN SERPL-MCNC: 3.6 G/DL (ref 3.4–5)
ALBUMIN/GLOB SERPL: 0.9 {RATIO} (ref 1–2)
ALP LIVER SERPL-CCNC: 37 U/L
ALT SERPL-CCNC: 24 U/L
ANION GAP SERPL CALC-SCNC: 6 MMOL/L (ref 0–18)
AST SERPL-CCNC: 14 U/L (ref 15–37)
BASOPHILS # BLD AUTO: 0.06 X10(3) UL (ref 0–0.2)
BASOPHILS NFR BLD AUTO: 0.8 %
BILIRUB SERPL-MCNC: 0.3 MG/DL (ref 0.1–2)
BUN BLD-MCNC: 11 MG/DL (ref 9–23)
CALCIUM BLD-MCNC: 9.4 MG/DL (ref 8.5–10.1)
CHLORIDE SERPL-SCNC: 112 MMOL/L (ref 98–112)
CHOLEST SERPL-MCNC: 214 MG/DL (ref ?–200)
CO2 SERPL-SCNC: 23 MMOL/L (ref 21–32)
CREAT BLD-MCNC: 0.95 MG/DL
EGFRCR SERPLBLD CKD-EPI 2021: 78 ML/MIN/1.73M2 (ref 60–?)
EOSINOPHIL # BLD AUTO: 0.38 X10(3) UL (ref 0–0.7)
EOSINOPHIL NFR BLD AUTO: 4.8 %
ERYTHROCYTE [DISTWIDTH] IN BLOOD BY AUTOMATED COUNT: 12.3 %
FASTING PATIENT LIPID ANSWER: YES
FASTING STATUS PATIENT QL REPORTED: YES
GLOBULIN PLAS-MCNC: 3.8 G/DL (ref 2.8–4.4)
GLUCOSE BLD-MCNC: 91 MG/DL (ref 70–99)
HCT VFR BLD AUTO: 42.3 %
HDLC SERPL-MCNC: 39 MG/DL (ref 40–59)
HGB BLD-MCNC: 14 G/DL
IMM GRANULOCYTES # BLD AUTO: 0.02 X10(3) UL (ref 0–1)
IMM GRANULOCYTES NFR BLD: 0.3 %
IRON SATN MFR SERPL: 17 %
IRON SERPL-MCNC: 73 UG/DL
LDLC SERPL CALC-MCNC: 150 MG/DL (ref ?–100)
LYMPHOCYTES # BLD AUTO: 2.98 X10(3) UL (ref 1–4)
LYMPHOCYTES NFR BLD AUTO: 37.9 %
MCH RBC QN AUTO: 29.2 PG (ref 26–34)
MCHC RBC AUTO-ENTMCNC: 33.1 G/DL (ref 31–37)
MCV RBC AUTO: 88.1 FL
MONOCYTES # BLD AUTO: 0.38 X10(3) UL (ref 0.1–1)
MONOCYTES NFR BLD AUTO: 4.8 %
NEUTROPHILS # BLD AUTO: 4.04 X10 (3) UL (ref 1.5–7.7)
NEUTROPHILS # BLD AUTO: 4.04 X10(3) UL (ref 1.5–7.7)
NEUTROPHILS NFR BLD AUTO: 51.4 %
NONHDLC SERPL-MCNC: 175 MG/DL (ref ?–130)
OSMOLALITY SERPL CALC.SUM OF ELEC: 291 MOSM/KG (ref 275–295)
PLATELET # BLD AUTO: 330 10(3)UL (ref 150–450)
POTASSIUM SERPL-SCNC: 4.1 MMOL/L (ref 3.5–5.1)
PROT SERPL-MCNC: 7.4 G/DL (ref 6.4–8.2)
RBC # BLD AUTO: 4.8 X10(6)UL
SODIUM SERPL-SCNC: 141 MMOL/L (ref 136–145)
TIBC SERPL-MCNC: 432 UG/DL (ref 240–450)
TRANSFERRIN SERPL-MCNC: 290 MG/DL (ref 200–360)
TRIGL SERPL-MCNC: 139 MG/DL (ref 30–149)
TSI SER-ACNC: 2.45 MIU/ML (ref 0.36–3.74)
VIT B12 SERPL-MCNC: 617 PG/ML (ref 193–986)
VIT D+METAB SERPL-MCNC: 44.1 NG/ML (ref 30–100)
VLDLC SERPL CALC-MCNC: 26 MG/DL (ref 0–30)
WBC # BLD AUTO: 7.9 X10(3) UL (ref 4–11)

## 2024-04-17 PROCEDURE — 84443 ASSAY THYROID STIM HORMONE: CPT

## 2024-04-17 PROCEDURE — 36415 COLL VENOUS BLD VENIPUNCTURE: CPT

## 2024-04-17 PROCEDURE — 83540 ASSAY OF IRON: CPT

## 2024-04-17 PROCEDURE — 82306 VITAMIN D 25 HYDROXY: CPT

## 2024-04-17 PROCEDURE — 83550 IRON BINDING TEST: CPT

## 2024-04-17 PROCEDURE — 80061 LIPID PANEL: CPT

## 2024-04-17 PROCEDURE — 80053 COMPREHEN METABOLIC PANEL: CPT

## 2024-04-17 PROCEDURE — 85025 COMPLETE CBC W/AUTO DIFF WBC: CPT

## 2024-04-17 PROCEDURE — 82607 VITAMIN B-12: CPT

## 2024-05-07 ENCOUNTER — OFFICE VISIT (OUTPATIENT)
Dept: OBGYN CLINIC | Facility: CLINIC | Age: 39
End: 2024-05-07
Payer: COMMERCIAL

## 2024-05-07 VITALS
SYSTOLIC BLOOD PRESSURE: 132 MMHG | DIASTOLIC BLOOD PRESSURE: 104 MMHG | WEIGHT: 270 LBS | BODY MASS INDEX: 46.1 KG/M2 | HEART RATE: 97 BPM | HEIGHT: 64 IN

## 2024-05-07 DIAGNOSIS — E28.39 PRIMARY OVARIAN INSUFFICIENCY: ICD-10-CM

## 2024-05-07 DIAGNOSIS — Z01.419 ENCOUNTER FOR WELL WOMAN EXAM WITH ROUTINE GYNECOLOGICAL EXAM: Primary | ICD-10-CM

## 2024-05-07 PROCEDURE — 3075F SYST BP GE 130 - 139MM HG: CPT | Performed by: OBSTETRICS & GYNECOLOGY

## 2024-05-07 PROCEDURE — 3080F DIAST BP >= 90 MM HG: CPT | Performed by: OBSTETRICS & GYNECOLOGY

## 2024-05-07 PROCEDURE — 3008F BODY MASS INDEX DOCD: CPT | Performed by: OBSTETRICS & GYNECOLOGY

## 2024-05-07 PROCEDURE — 99396 PREV VISIT EST AGE 40-64: CPT | Performed by: OBSTETRICS & GYNECOLOGY

## 2024-05-07 NOTE — PROGRESS NOTES
[facility]  Obstetrics and Gynecology    Subjective:     Soo Pollack is a 38 year old  who presents for an annual gyn exam. Patient complaints include irregular bleeding since staring OCP, but does feel its getting better.    Patient's last menstrual period was 2024 (exact date).   Menarche: 11 (2024 12:35 PM)  Period Cycle (Days): irregular (2024 12:35 PM)  Period Duration (Days): varies, 21 days, 14 days, currently bleeding (2024 12:35 PM)  Period Flow: Heavier then normal (2024 12:35 PM)  Use of Birth Control (if yes, specify type): OCP (2024 12:35 PM)  Hx Prior Abnormal Pap: No (2024 12:35 PM)  Pap Date: 23 (2024 12:35 PM)  Pap Result Notes: wnl (2024 12:35 PM)     Dyspareunia: No.    Difficulty with bowel or bladder function: No.   History of STDs: No.  Smoker: No.  Safe at home: Yes.  , 9 and 8yo kids.    Screening:  Pap smear: neg   Mammogram: - not yet done, ordered  Colonoscopy: n/a -   DEXA: n/a No recommendations at this time     Review of Systems  Constitutional: Denies fever/chills, weight loss, fatigue, weakness, or sweating  HEENT: Denies headache, hearing loss or tinnitus, ear pain or discharge, nosebleeds, congestion, sore throat  Eye: Denies visual changes, eye pain or discharge or redness  Cardiovascular: Denies chest pain, palpitations  Pulmonary: Denies cough, shortness of breath, wheezing  Breast: denies breast pain or palpable mass, skin changes, nipple discharge  GI: Denies nausea, vomiting, diarrhea, constipation, heartburn, hemachezia  : Denies dysuria, urgency, frequency, hematuria, flank pain  Musculoskeletal: Denies myalgias, pain in neck or back or joints  Skin: Denies rash, itching  Endocrine: Denies easy bruising or bleeding, increased thirst  Neuro: Denies dizziness, paraesthesias, focal weakness, seizures, loss of consciousness  Psych: Denies depression, anxiety, suicidal ideations, hallucinations,  insomnia     Past Medical History   Past Medical History:    Asthma (HCC)         Past Obstetric History   OB History    Para Term  AB Living   3 2 2   1 2   SAB IAB Ectopic Multiple Live Births   1       2      # Outcome Date GA Lbr Foster/2nd Weight Sex Type Anes PTL Lv   3 Term 16    F Caesarean   JOHN   2 Term 12    M Caesarean EPI  JOHN   1 SAB  10w0d    SAB   FD       Past Surgical History   Past Surgical History:   Procedure Laterality Date          D & c      [place] teeth removed          Family History   Family History   Problem Relation Age of Onset    Hypertension Mother     Other (Other) Mother         epilipsy        Social History   Social History     Socioeconomic History    Marital status:    Tobacco Use    Smoking status: Never    Smokeless tobacco: Never   Vaping Use    Vaping status: Never Used   Substance and Sexual Activity    Alcohol use: Yes     Comment: socially    Drug use: Never    Sexual activity: Yes     Partners: Male   Other Topics Concern    Caffeine Concern Yes    Exercise Yes    Seat Belt Yes        Medications   Current Outpatient Medications on File Prior to Visit   Medication Sig Dispense Refill    montelukast 10 MG Oral Tab Take 1 tablet (10 mg total) by mouth nightly.      Tirzepatide-Weight Management (ZEPBOUND) 10 MG/0.5ML Subcutaneous Solution Auto-injector Inject 10 mg into the skin once a week. 2 mL 0    Levonorgest-Eth Estrad -Day 0.15-0.03 MG Oral Tab Take 1 tablet by mouth daily. 91 tablet 4    metFORMIN  MG Oral Tablet 24 Hr Take 1 tablet (750 mg total) by mouth daily. With a meal 90 tablet 1    Biotin w/ Vitamins C & E (HAIR/SKIN/NAILS OR) Take by mouth.      cholecalciferol 50 MCG (2000 UT) Oral Cap       loratadine (CLARITIN) 10 MG Oral Tab       omega-3 fatty acids 1000 MG Oral Cap       Multiple Vitamins-Minerals (MULTI-VITAMIN/MINERALS) Oral Tab Take 1 tablet by mouth daily.      FLOVENT  MCG/ACT Inhalation  Aerosol  (Patient not taking: Reported on 3/7/2024)      albuterol 108 (90 Base) MCG/ACT Inhalation Aero Soln Inhale 2 puffs into the lungs every 6 (six) hours as needed for Wheezing. (Patient not taking: Reported on 3/7/2024)       No current facility-administered medications on file prior to visit.       Allergies   Allergies   Allergen Reactions    Seasonal Coughing, ITCHING and WHEEZING          Objective:     Vitals:    24 1234   BP: (!) 132/104   Pulse: 97   Weight: 270 lb (122.5 kg)   Height: 64\"       Body mass index is 46.35 kg/m².    GEN: AAOx3, NAD, appears well, appears stated age  HEENT: normocephalic, atraumatic, thyroid symmetric without enlargement or nodularity  BREAST: macromastia, bilaterally symmetric with no palpable masses, no nipple discharge, no skin changes  CV: RRR  PULM: CTAB  ABD: soft, NT, ND, no rebound or guarding  EXT: no c/c/e or tenderness  NEURO: CN 2-12 grossly intact  PELVIC:   Vulva: NEFG.   Vagina: Estrogenized, physiologic/light menstrual discharge.    Cervix: No lesions or tenderness.     Uterus/adnexa: no tenderness, difficult to assess size    Rectal: Anus and perineum are normal.    Chaperone offered and declined.     Assessment:     Soo Pollack is a 38 year old  seen for well-women gyn exam.    Plan:     -- POI: counseled again on FXS screening and karyotyping, continue cOCP for hormonal protection  -- cervical cancer screening: up to date with pap smear.   -- breast cancer screening: continue annual CBE, start annual mammograms at 39yo  -- STD screening ordered: No  -- Contraception: OCP  -- Discussed further preventative care with PCP, staying up to date with screening and vaccinations, and maintaining healthy diet and exercise.      -- Follow up in 1 year for annual exam or sooner as needed    Dana Pena MD  [facility]  2024 12:46 PM

## 2024-05-16 ENCOUNTER — PATIENT MESSAGE (OUTPATIENT)
Facility: CLINIC | Age: 39
End: 2024-05-16

## 2024-05-16 DIAGNOSIS — E66.01 CLASS 3 SEVERE OBESITY WITH SERIOUS COMORBIDITY AND BODY MASS INDEX (BMI) OF 50.0 TO 59.9 IN ADULT, UNSPECIFIED OBESITY TYPE (HCC): Primary | ICD-10-CM

## 2024-05-17 RX ORDER — MONTELUKAST SODIUM 10 MG/1
10 TABLET ORAL NIGHTLY
Qty: 90 TABLET | Refills: 0 | Status: SHIPPED | OUTPATIENT
Start: 2024-05-17

## 2024-05-17 NOTE — TELEPHONE ENCOUNTER
Requesting            Disp Refills Start End    montelukast (SINGULAIR) 10 MG Oral Tab 90 tablet 3 4/6/2023 3/31/2024   Sig - Route: Take 1 tablet (10 mg total) by mouth daily. - Oral   Sent to pharmacy as: Montelukast Sodium 10 MG Oral Tablet (Singulair)     LOV: 4/16/2024 w/[place] for wellness visit  RTC: Follow up as needed for preventive care and for medication refills.     Last Relevant Labs: 4/17/2024    Filled:     Dispensed Written Strength Quantity Refills Days Supply Provider Pharmacy    MONTELUKAST 10MG TABLETS 01/11/2024 04/06/2023  90 each  90 Bryn Ennis [facility]       Future Appointments   Date Time Provider Department Center   6/27/2024  2:40 PM Pippa Edgar PA-C [facility] [address]     Asthma & COPD Medication Protocol [id number]/16/2024 07:54 PM   Protocol Details Asthma Action Score greater than or equal to 20    AAP/ACT given in last 12 months    Appointment in past 6 or next 3 months     Dx allergic rhinitis  Rx sent

## 2024-05-17 NOTE — TELEPHONE ENCOUNTER
From: Soo Pollack  To: Pippa Edgar  Sent: 5/16/2024 7:57 PM CDT  Subject: Zepbound 12.5    Hello. I am taking my 4th shot of Zepbound 12.5 tonight. I would like to remain on this dose for one more round (4 shots) if possible. It did take me a while to get used to this dosage and in the following days after taking it I had very little appetite and some nausea. I’d like to remain on this dose to get used to it longer before moving up. Can you please send a refill to the pharmacy? Thank you.

## 2024-05-17 NOTE — TELEPHONE ENCOUNTER
Requesting Zepbound 12.5 mg  LOV: 3/7/24  RTC: not noted  Last Relevant Labs: na  Filled: 4/14/24 #2ml with 0 refills    Future Appointments   Date Time Provider Department Center   6/27/2024  2:40 PM Pippa Edgar, ATA [facility] [address]

## 2024-05-20 RX ORDER — TIRZEPATIDE 12.5 MG/.5ML
12.5 INJECTION, SOLUTION SUBCUTANEOUS WEEKLY
Qty: 2 ML | Refills: 0 | Status: SHIPPED | OUTPATIENT
Start: 2024-05-20 | End: 2024-06-11

## 2024-06-08 ENCOUNTER — PATIENT MESSAGE (OUTPATIENT)
Facility: CLINIC | Age: 39
End: 2024-06-08

## 2024-06-10 NOTE — TELEPHONE ENCOUNTER
From: Soo Pollack  To: Pippa Edgar  Sent: 6/8/2024 2:51 PM CDT  Subject: Zepbound 15 mg    Hello. I just took my 3rd shot of the Zepbound 15mg on Thursday. Do I need to increase in dosage or do I remain on this dosage? Thank you for your help!

## 2024-06-11 NOTE — TELEPHONE ENCOUNTER
Requesting   Requested Prescriptions     Pending Prescriptions Disp Refills    METFORMIN  MG Oral Tablet 24 Hr [Pharmacy Med Name: METFORMIN ER 750MG 24HR TABS] 90 tablet 1     Sig: TAKE 1 TABLET(750 MG) BY MOUTH DAILY WITH A MEAL      LOV: 3/7/24  RTC:   Last Relevant Labs:   Filled: 12/06/23 #90 with 1 refills    Future Appointments   Date Time Provider Department Center   6/27/2024  2:40 PM Pippa Edgar PA-C [facility] [address]      
ambulatory

## 2024-06-12 RX ORDER — METFORMIN HYDROCHLORIDE 750 MG/1
TABLET, EXTENDED RELEASE ORAL
Qty: 90 TABLET | Refills: 1 | Status: SHIPPED | OUTPATIENT
Start: 2024-06-12

## 2024-06-27 ENCOUNTER — OFFICE VISIT (OUTPATIENT)
Facility: CLINIC | Age: 39
End: 2024-06-27
Payer: COMMERCIAL

## 2024-06-27 VITALS
BODY MASS INDEX: 44.56 KG/M2 | HEART RATE: 82 BPM | RESPIRATION RATE: 18 BRPM | WEIGHT: 261 LBS | SYSTOLIC BLOOD PRESSURE: 122 MMHG | HEIGHT: 64 IN | DIASTOLIC BLOOD PRESSURE: 70 MMHG | OXYGEN SATURATION: 98 %

## 2024-06-27 DIAGNOSIS — E88.819 INSULIN RESISTANCE: ICD-10-CM

## 2024-06-27 DIAGNOSIS — E66.01 CLASS 3 SEVERE OBESITY WITH SERIOUS COMORBIDITY AND BODY MASS INDEX (BMI) OF 40.0 TO 44.9 IN ADULT, UNSPECIFIED OBESITY TYPE (HCC): ICD-10-CM

## 2024-06-27 DIAGNOSIS — Z51.81 ENCOUNTER FOR THERAPEUTIC DRUG LEVEL MONITORING: Primary | ICD-10-CM

## 2024-06-27 DIAGNOSIS — E78.1 HYPERTRIGLYCERIDEMIA: ICD-10-CM

## 2024-06-27 DIAGNOSIS — E55.9 VITAMIN D DEFICIENCY: ICD-10-CM

## 2024-06-27 DIAGNOSIS — E78.2 MIXED HYPERLIPIDEMIA: ICD-10-CM

## 2024-06-27 PROCEDURE — 3008F BODY MASS INDEX DOCD: CPT | Performed by: PHYSICIAN ASSISTANT

## 2024-06-27 PROCEDURE — 3078F DIAST BP <80 MM HG: CPT | Performed by: PHYSICIAN ASSISTANT

## 2024-06-27 PROCEDURE — 3074F SYST BP LT 130 MM HG: CPT | Performed by: PHYSICIAN ASSISTANT

## 2024-06-27 PROCEDURE — 99214 OFFICE O/P EST MOD 30 MIN: CPT | Performed by: PHYSICIAN ASSISTANT

## 2024-06-27 RX ORDER — TIRZEPATIDE 15 MG/.5ML
INJECTION, SOLUTION SUBCUTANEOUS
COMMUNITY
Start: 2024-06-13

## 2024-06-27 NOTE — PROGRESS NOTES
HISTORY OF PRESENT ILLNESS  Chief Complaint   Patient presents with    Weight Check     -32lbs       Soo Pollack is a 38 year old female here for follow up in medical weight loss program.   -32lbs  Compliant on metformin and zepbound  Denies chest pain, shortness of breath, dizziness, blurred vision, headache, paresthesia, nausea/vomiting.   Struggled with the 10mg dose, but has been ok on the 15mg   Trying to prioritize protein  This past month hasn't been as consistent with summer , still trying to get more movement, walking, gardening  Exercise/Activity:   Nutrition: 24 hour food log reviewed, eating regular meals, +protein  Stress: 6/10  Sleep: 5 hours/night       Wt Readings from Last 6 Encounters:   06/27/24 261 lb (118.4 kg)   05/07/24 270 lb (122.5 kg)   04/16/24 285 lb 9.6 oz (129.5 kg)   03/07/24 293 lb (132.9 kg)   01/31/24 (!) 309 lb (140.2 kg)   12/06/23 (!) 313 lb (142 kg)            Breakfast Lunch Dinner Snacks Fluids   Reviewed           REVIEW OF SYSTEMS  GENERAL HEALTH: feels well otherwise, denied any fevers chills or night sweats   RESPIRATORY: denies shortness of breath   CARDIOVASCULAR: denies chest pain  GI: denies abdominal pain    EXAM  /70   Pulse 82   Resp 18   Ht 5' 4\" (1.626 m)   Wt 261 lb (118.4 kg)   LMP 05/03/2024 (Exact Date)   SpO2 98%   BMI 44.80 kg/m²   GENERAL: well developed, well nourished,in no apparent distress, A/O x3  SKIN: no rashes,no suspicious lesions  HEENT: atraumatic, normocephalic, OP-clear, PERRL  NECK: supple,no adenopathy  LUNGS: clear to auscultation bilaterally   CARDIO: RRR without murmur  GI: good BS's,NT/ND, no masses or HSM  EXTREMITIES: no cyanosis, no clubbing, no edema    Lab Results   Component Value Date    WBC 7.9 04/17/2024    RBC 4.80 04/17/2024    HGB 14.0 04/17/2024    HCT 42.3 04/17/2024    MCV 88.1 04/17/2024    MCH 29.2 04/17/2024    MCHC 33.1 04/17/2024    RDW 12.3 04/17/2024    .0 04/17/2024     Lab Results    Component Value Date    GLU 91 04/17/2024    BUN 11 04/17/2024    CREATSERUM 0.95 04/17/2024    ANIONGAP 6 04/17/2024    CA 9.4 04/17/2024    OSMOCALC 291 04/17/2024    ALKPHO 37 04/17/2024    AST 14 (L) 04/17/2024    ALT 24 04/17/2024    BILT 0.3 04/17/2024    TP 7.4 04/17/2024    ALB 3.6 04/17/2024    GLOBULIN 3.8 04/17/2024     04/17/2024    K 4.1 04/17/2024     04/17/2024    CO2 23.0 04/17/2024     Lab Results   Component Value Date     04/03/2023    A1C 5.5 04/03/2023     Lab Results   Component Value Date    CHOLEST 214 (H) 04/17/2024    TRIG 139 04/17/2024    HDL 39 (L) 04/17/2024     (H) 04/17/2024    VLDL 26 04/17/2024    NONHDLC 175 (H) 04/17/2024     Lab Results   Component Value Date    TSH 2.450 04/17/2024     Lab Results   Component Value Date    B12 617 04/17/2024     Lab Results   Component Value Date    VITD 44.1 04/17/2024       Current Outpatient Medications on File Prior to Visit   Medication Sig Dispense Refill    ZEPBOUND 15 MG/0.5ML Subcutaneous Solution Auto-injector ADMINISTER 15 MG UNDER THE SKIN 1 TIME A WEEK FOR 4 DOSES      METFORMIN  MG Oral Tablet 24 Hr TAKE 1 TABLET(750 MG) BY MOUTH DAILY WITH A MEAL 90 tablet 1    montelukast 10 MG Oral Tab Take 1 tablet (10 mg total) by mouth nightly. 90 tablet 0    Levonorgest-Eth Estrad 91-Day 0.15-0.03 MG Oral Tab Take 1 tablet by mouth daily. 91 tablet 4    FLOVENT  MCG/ACT Inhalation Aerosol       Biotin w/ Vitamins C & E (HAIR/SKIN/NAILS OR) Take by mouth.      cholecalciferol 50 MCG (2000 UT) Oral Cap       loratadine (CLARITIN) 10 MG Oral Tab       omega-3 fatty acids 1000 MG Oral Cap       Multiple Vitamins-Minerals (MULTI-VITAMIN/MINERALS) Oral Tab Take 1 tablet by mouth daily.      albuterol 108 (90 Base) MCG/ACT Inhalation Aero Soln Inhale 2 puffs into the lungs every 6 (six) hours as needed for Wheezing.       No current facility-administered medications on file prior to visit.        ASSESSMENT  Analyzed weight data:       Diagnoses and all orders for this visit:    Encounter for therapeutic drug level monitoring    Class 3 severe obesity with serious comorbidity and body mass index (BMI) of 40.0 to 44.9 in adult, unspecified obesity type (HCC)    Mixed hyperlipidemia    Insulin resistance    Hypertriglyceridemia    Vitamin D deficiency        PLAN  Initial Weight: 347 lbs  Initial Weight Date: 08/10/23  Today's Weight: 261 lbs  5% Goal: 17.35  10% Goal: 34.7  Total Weight Loss: -32lbs/Net loss 86 lbs    Will continue metformin and zepbound - advised side effects and adverse effects of medication   HLD - lifestyle changes  Insulin resistance - continue current medications, low carb diet  Hypertriglyceridemia - reviewed recent labs, levels are improved, continue to work on lifestyle changes  Vit D supplement, take with food  Consistency with logging foods - protein and produce  Incorporate strength/resistance training  Nutrition: low carb diet/ recommended to eat breakfast daily/ regular protein intake  Medication use and side effects reviewed with patient.  Medication contraindications: EKG prior to stimulant  Follow up with dietitian and psychologist as recommended.  Discussed the role of sleep and stress in weight management.  Counseled on comprehensive weight loss plan including attention to nutrition, exercise and behavior/stress management for success. See patient instruction below for more details.  Discussed strategies to overcome barriers to successful weight loss and weight maintenance  FITTE: ACSM recommendations (150-300 minutes/ week in active weight loss)   Weight Loss consent to treat reviewed and signed       NOTE TO PATIENT: The 21st Century Cures Act makes clinical notes like these available to patients in the interest of transparency. Clinical notes are medical documents used by physicians and care providers to communicate with each other. These documents include medical  language and terminology, abbreviations, and treatment information that may sound technical and at times possibly unfamiliar. In addition, at times, the verbiage may appear blunt or direct. These documents are one tool providers use to communicate relevant information and clinical opinions of the care providers in a way that allows common understanding of the clinical context.     There are no Patient Instructions on file for this visit.    No follow-ups on file.    Patient verbalizes understanding.    Pippa Edgar PA-C  6/27/2024

## 2024-07-16 ENCOUNTER — HOSPITAL ENCOUNTER (OUTPATIENT)
Dept: MAMMOGRAPHY | Age: 39
Discharge: HOME | End: 2024-07-16
Attending: FAMILY MEDICINE
Payer: COMMERCIAL

## 2024-07-16 DIAGNOSIS — Z12.31 ENCOUNTER FOR SCREENING MAMMOGRAM FOR MALIGNANT NEOPLASM OF BREAST: ICD-10-CM

## 2024-07-16 PROCEDURE — 77063 BREAST TOMOSYNTHESIS BI: CPT | Performed by: FAMILY MEDICINE

## 2024-07-16 PROCEDURE — 77067 SCR MAMMO BI INCL CAD: CPT | Performed by: FAMILY MEDICINE

## 2024-07-29 ENCOUNTER — HOSPITAL ENCOUNTER (OUTPATIENT)
Dept: ULTRASOUND IMAGING | Age: 39
Discharge: HOME | End: 2024-07-29
Attending: FAMILY MEDICINE
Payer: COMMERCIAL

## 2024-07-29 ENCOUNTER — HOSPITAL ENCOUNTER (OUTPATIENT)
Dept: MAMMOGRAPHY | Age: 39
Discharge: HOME | End: 2024-07-29
Attending: FAMILY MEDICINE
Payer: COMMERCIAL

## 2024-07-29 DIAGNOSIS — R92.2 INCONCLUSIVE MAMMOGRAM: ICD-10-CM

## 2024-07-29 PROCEDURE — 76642 ULTRASOUND BREAST LIMITED: CPT | Performed by: FAMILY MEDICINE

## 2024-07-29 PROCEDURE — 77061 BREAST TOMOSYNTHESIS UNI: CPT | Performed by: FAMILY MEDICINE

## 2024-07-29 PROCEDURE — 77065 DX MAMMO INCL CAD UNI: CPT | Performed by: FAMILY MEDICINE

## 2024-07-31 ENCOUNTER — PATIENT MESSAGE (OUTPATIENT)
Dept: OBGYN CLINIC | Facility: CLINIC | Age: 39
End: 2024-07-31

## 2024-08-09 ENCOUNTER — PATIENT MESSAGE (OUTPATIENT)
Facility: CLINIC | Age: 39
End: 2024-08-09

## 2024-08-09 DIAGNOSIS — E66.01 CLASS 3 SEVERE OBESITY WITH SERIOUS COMORBIDITY AND BODY MASS INDEX (BMI) OF 40.0 TO 44.9 IN ADULT, UNSPECIFIED OBESITY TYPE (HCC): Primary | ICD-10-CM

## 2024-08-12 RX ORDER — TIRZEPATIDE 15 MG/.5ML
INJECTION, SOLUTION SUBCUTANEOUS
Refills: 0 | OUTPATIENT
Start: 2024-08-12

## 2024-08-12 NOTE — TELEPHONE ENCOUNTER
Requesting zepbound 15 mg  LOV: 6/27/24  RTC: not noted  Last Relevant Labs: na  Filled: 5/21/24 #2ml with 2 refills    Future Appointments   Date Time Provider Department Center   10/24/2024 12:40 PM Pippa Edgar, ATA [facility] [address]

## 2024-08-12 NOTE — TELEPHONE ENCOUNTER
From: Soo Pollack  To: Pippa Edgar  Sent: 8/9/2024 3:45 PM CDT  Subject: Medication Refill Needed     Hello. I tried to request a refill of the Zepbound 15 mg through the [facility] but it is showing me that it went to a provider named Rolanda Alvarez and not to Pippa Edgar so I am unsure if your office will get the request.     I am currently out of refills of the Zepbound 15 mg. Anne tried to contact the office to request a refill as well but nobody has called them back yet. Can you please refill this medication for me? Thank you for your help!

## 2024-08-14 RX ORDER — TIRZEPATIDE 15 MG/.5ML
15 INJECTION, SOLUTION SUBCUTANEOUS WEEKLY
Qty: 2 ML | Refills: 2 | Status: SHIPPED | OUTPATIENT
Start: 2024-08-14

## 2024-08-22 RX ORDER — MONTELUKAST SODIUM 10 MG/1
10 TABLET ORAL NIGHTLY
Qty: 90 TABLET | Refills: 0 | Status: SHIPPED | OUTPATIENT
Start: 2024-08-22

## 2024-08-22 NOTE — TELEPHONE ENCOUNTER
Requesting Montelukast 10mg  LOV: 8/12/24 acute  RTC: prn  Last Relevant Labs:   Filled: 4/11/24 #90 with 0 refills    Future Appointments   Date Time Provider Department Center   10/24/2024 12:40 PM Pippa Edgar, ATA [facility] [address]     No hx of asthma.    Rx sent

## 2024-10-31 ENCOUNTER — PATIENT MESSAGE (OUTPATIENT)
Facility: CLINIC | Age: 39
End: 2024-10-31

## 2024-10-31 DIAGNOSIS — E66.813 CLASS 3 SEVERE OBESITY WITH SERIOUS COMORBIDITY AND BODY MASS INDEX (BMI) OF 40.0 TO 44.9 IN ADULT, UNSPECIFIED OBESITY TYPE (HCC): ICD-10-CM

## 2024-10-31 DIAGNOSIS — E66.01 CLASS 3 SEVERE OBESITY WITH SERIOUS COMORBIDITY AND BODY MASS INDEX (BMI) OF 40.0 TO 44.9 IN ADULT, UNSPECIFIED OBESITY TYPE (HCC): ICD-10-CM

## 2024-11-01 NOTE — TELEPHONE ENCOUNTER
Requesting zepbound 15 mg  LOV: 6/27/24  RTC: not noted  Last Relevant Labs: na  Filled: 8/14/24 #2 with 2 refills  15 mg dose    Future Appointments   Date Time Provider Department Center   11/13/2024  2:20 PM Pippa Edgar, ATA [facility] [address]

## 2024-11-04 RX ORDER — TIRZEPATIDE 15 MG/.5ML
15 INJECTION, SOLUTION SUBCUTANEOUS WEEKLY
Qty: 2 ML | Refills: 2 | Status: SHIPPED | OUTPATIENT
Start: 2024-11-04

## 2024-11-13 ENCOUNTER — OFFICE VISIT (OUTPATIENT)
Facility: CLINIC | Age: 39
End: 2024-11-13
Payer: COMMERCIAL

## 2024-11-13 VITALS
WEIGHT: 219 LBS | OXYGEN SATURATION: 98 % | HEIGHT: 64 IN | DIASTOLIC BLOOD PRESSURE: 74 MMHG | HEART RATE: 93 BPM | SYSTOLIC BLOOD PRESSURE: 112 MMHG | RESPIRATION RATE: 18 BRPM | BODY MASS INDEX: 37.39 KG/M2

## 2024-11-13 DIAGNOSIS — E78.1 HYPERTRIGLYCERIDEMIA: ICD-10-CM

## 2024-11-13 DIAGNOSIS — Z51.81 ENCOUNTER FOR THERAPEUTIC DRUG LEVEL MONITORING: Primary | ICD-10-CM

## 2024-11-13 DIAGNOSIS — E66.01 CLASS 3 SEVERE OBESITY WITH SERIOUS COMORBIDITY AND BODY MASS INDEX (BMI) OF 40.0 TO 44.9 IN ADULT, UNSPECIFIED OBESITY TYPE (HCC): ICD-10-CM

## 2024-11-13 DIAGNOSIS — E55.9 VITAMIN D DEFICIENCY: ICD-10-CM

## 2024-11-13 DIAGNOSIS — E66.812 CLASS 2 OBESITY WITH BODY MASS INDEX (BMI) OF 37.0 TO 37.9 IN ADULT, UNSPECIFIED OBESITY TYPE, UNSPECIFIED WHETHER SERIOUS COMORBIDITY PRESENT: ICD-10-CM

## 2024-11-13 DIAGNOSIS — E78.2 MIXED HYPERLIPIDEMIA: ICD-10-CM

## 2024-11-13 DIAGNOSIS — E66.813 CLASS 3 SEVERE OBESITY WITH SERIOUS COMORBIDITY AND BODY MASS INDEX (BMI) OF 40.0 TO 44.9 IN ADULT, UNSPECIFIED OBESITY TYPE (HCC): ICD-10-CM

## 2024-11-13 DIAGNOSIS — E88.819 INSULIN RESISTANCE: ICD-10-CM

## 2024-11-13 PROCEDURE — 3078F DIAST BP <80 MM HG: CPT | Performed by: PHYSICIAN ASSISTANT

## 2024-11-13 PROCEDURE — 3074F SYST BP LT 130 MM HG: CPT | Performed by: PHYSICIAN ASSISTANT

## 2024-11-13 PROCEDURE — 3008F BODY MASS INDEX DOCD: CPT | Performed by: PHYSICIAN ASSISTANT

## 2024-11-13 PROCEDURE — 99214 OFFICE O/P EST MOD 30 MIN: CPT | Performed by: PHYSICIAN ASSISTANT

## 2024-11-13 RX ORDER — METFORMIN HYDROCHLORIDE 750 MG/1
750 TABLET, EXTENDED RELEASE ORAL
Qty: 90 TABLET | Refills: 1 | Status: SHIPPED | OUTPATIENT
Start: 2024-11-13

## 2024-11-13 RX ORDER — TIRZEPATIDE 15 MG/.5ML
15 INJECTION, SOLUTION SUBCUTANEOUS WEEKLY
Qty: 6 ML | Refills: 1 | Status: SHIPPED | OUTPATIENT
Start: 2024-11-13

## 2024-11-13 NOTE — PROGRESS NOTES
HISTORY OF PRESENT ILLNESS  Chief Complaint   Patient presents with    Weight Check     -42lbs       Soo Pollack is a 39 year old female here for follow up in medical weight loss program.   -42lbs  Compliant on zepbound and metformin  Denies chest pain, shortness of breath, dizziness, blurred vision, headache, paresthesia, nausea/vomiting.   Butler and greasier meats don't sit well  Trying to prioritize protein  Early October had strep, and work has been really busy so exercise hasn't been as consistent    Nutrition: 24 hour food log reviewed, eating regular meals, +protein  Stress: 7/10 - higher with work  Sleep: no new problems    [facility] Follow Up    General Information  Success Moment: I am down a panrs size!  Challenging Moment: I have had triuble latwly motivating myself to work   out after getting over strep in early October  Nutrition Recall  Exercise   Patient stated exercises # days/week: 3  Patient stated perceived level of   exertion: 2 Anaerobic Days: 0   Aerobic Days: 3   Patient stated average level of stress: 7  Sleep   Patient stated # hours uninterrupted sleep: 4   Patient stated feels   restful: Yes      Cause of disruption of sleep: Kids              Wt Readings from Last 6 Encounters:   11/13/24 219 lb (99.3 kg)   06/27/24 261 lb (118.4 kg)   05/07/24 270 lb (122.5 kg)   04/16/24 285 lb 9.6 oz (129.5 kg)   03/07/24 293 lb (132.9 kg)   01/31/24 (!) 309 lb (140.2 kg)            Breakfast Lunch Dinner Snacks Fluids   Reviewed           REVIEW OF SYSTEMS  GENERAL HEALTH: feels well otherwise, denied any fevers chills or night sweats   RESPIRATORY: denies shortness of breath   CARDIOVASCULAR: denies chest pain  GI: denies abdominal pain    EXAM  /74   Pulse 93   Resp 18   Ht 5' 4\" (1.626 m)   Wt 219 lb (99.3 kg)   LMP 05/03/2024 (Exact Date)   SpO2 98%   BMI 37.59 kg/m²   GENERAL: well developed, well nourished,in no apparent distress, A/O x3  SKIN: no rashes,no suspicious  lesions  HEENT: atraumatic, normocephalic, OP-clear, PERRL  NECK: supple,no adenopathy  LUNGS: clear to auscultation bilaterally   CARDIO: RRR without murmur  GI: good BS's,NT/ND, no masses or HSM  EXTREMITIES: no cyanosis, no clubbing, no edema    Lab Results   Component Value Date    WBC 7.9 04/17/2024    RBC 4.80 04/17/2024    HGB 14.0 04/17/2024    HCT 42.3 04/17/2024    MCV 88.1 04/17/2024    MCH 29.2 04/17/2024    MCHC 33.1 04/17/2024    RDW 12.3 04/17/2024    .0 04/17/2024     Lab Results   Component Value Date    GLU 91 04/17/2024    BUN 11 04/17/2024    CREATSERUM 0.95 04/17/2024    ANIONGAP 6 04/17/2024    CA 9.4 04/17/2024    OSMOCALC 291 04/17/2024    ALKPHO 37 04/17/2024    AST 14 (L) 04/17/2024    ALT 24 04/17/2024    BILT 0.3 04/17/2024    TP 7.4 04/17/2024    ALB 3.6 04/17/2024    GLOBULIN 3.8 04/17/2024     04/17/2024    K 4.1 04/17/2024     04/17/2024    CO2 23.0 04/17/2024     Lab Results   Component Value Date     04/03/2023    A1C 5.5 04/03/2023     Lab Results   Component Value Date    CHOLEST 214 (H) 04/17/2024    TRIG 139 04/17/2024    HDL 39 (L) 04/17/2024     (H) 04/17/2024    VLDL 26 04/17/2024    NONHDLC 175 (H) 04/17/2024     Lab Results   Component Value Date    TSH 2.450 04/17/2024     Lab Results   Component Value Date    B12 617 04/17/2024     Lab Results   Component Value Date    VITD 44.1 04/17/2024       Medications Ordered Prior to Encounter[1]    ASSESSMENT  Analyzed weight data:  Weight Calculations  Initial Weight: 347 lbs  Initial Weight Date: 08/10/23  Today's Weight: 219 lbs  5% Goal: 17.35  10% Goal: 34.7  Total Weight Loss: 128 lbs    Diagnoses and all orders for this visit:    Encounter for therapeutic drug level monitoring    Class 2 obesity with body mass index (BMI) of 37.0 to 37.9 in adult, unspecified obesity type, unspecified whether serious comorbidity present    Mixed hyperlipidemia    Insulin  resistance    Hypertriglyceridemia    Vitamin D deficiency    Class 3 severe obesity with serious comorbidity and body mass index (BMI) of 40.0 to 44.9 in adult, unspecified obesity type (HCC)  -     ZEPBOUND 15 MG/0.5ML Subcutaneous Solution Auto-injector; Inject 15 mg into the skin once a week.    Other orders  -     metFORMIN  MG Oral Tablet 24 Hr; Take 1 tablet (750 mg total) by mouth daily with breakfast.        PLAN  Initial Weight: 347 lbs  Initial Weight Date: 08/10/23  Today's Weight: 219 lbs  5% Goal: 17.35  10% Goal: 34.7  Total Weight Loss: -42lbs/Net loss 128 lbs    Will continue zepbound and metformin - advised side effects and adverse effects of medication   HLD - lifestyle changes  Insulin resistance - continue current medications, low carb diet  Vit D supplement, take with food  Consistency with logging foods - protein and produce  Incorporate strength/resistance training  Nutrition: low carb diet/ recommended to eat breakfast daily/ regular protein intake  Medication use and side effects reviewed with patient.  Medication contraindications: EKG prior to stimulant   Follow up with dietitian and psychologist as recommended.  Discussed the role of sleep and stress in weight management.  Counseled on comprehensive weight loss plan including attention to nutrition, exercise and behavior/stress management for success. See patient instruction below for more details.  Discussed strategies to overcome barriers to successful weight loss and weight maintenance  FITTE: ACSM recommendations (150-300 minutes/ week in active weight loss)   Weight Loss consent to treat reviewed and signed       NOTE TO PATIENT: The 21st Century Cures Act makes clinical notes like these available to patients in the interest of transparency. Clinical notes are medical documents used by physicians and care providers to communicate with each other. These documents include medical language and terminology, abbreviations, and  treatment information that may sound technical and at times possibly unfamiliar. In addition, at times, the verbiage may appear blunt or direct. These documents are one tool providers use to communicate relevant information and clinical opinions of the care providers in a way that allows common understanding of the clinical context.     There are no Patient Instructions on file for this visit.    No follow-ups on file.    Patient verbalizes understanding.    Pippa Edgar PA-C  11/13/2024           [1]   Current Outpatient Medications on File Prior to Visit   Medication Sig Dispense Refill    montelukast 10 MG Oral Tab Take 1 tablet (10 mg total) by mouth nightly. 90 tablet 0    Levonorgest-Eth Estrad 91-Day 0.15-0.03 MG Oral Tab Take 1 tablet by mouth daily. 91 tablet 4    FLOVENT  MCG/ACT Inhalation Aerosol       Biotin w/ Vitamins C & E (HAIR/SKIN/NAILS OR) Take by mouth.      cholecalciferol 50 MCG (2000 UT) Oral Cap       omega-3 fatty acids 1000 MG Oral Cap       Multiple Vitamins-Minerals (MULTI-VITAMIN/MINERALS) Oral Tab Take 1 tablet by mouth daily.      albuterol 108 (90 Base) MCG/ACT Inhalation Aero Soln Inhale 2 puffs into the lungs every 6 (six) hours as needed for Wheezing.      loratadine (CLARITIN) 10 MG Oral Tab        No current facility-administered medications on file prior to visit.

## 2024-11-26 RX ORDER — MONTELUKAST SODIUM 10 MG/1
10 TABLET ORAL NIGHTLY
Qty: 90 TABLET | Refills: 0 | Status: SHIPPED | OUTPATIENT
Start: 2024-11-26

## 2024-11-26 NOTE — TELEPHONE ENCOUNTER
[facility]        Pt Name: William Briseno  MRN: [id number]  [place] 2017  Date of evaluation: 1/17/2021  Provider: JUANIS Willingham - DUANE  PCP: Mary Kate San MD     I have seen and evaluated this patient with my supervising physician Tati Craft       Chief Complaint   Patient presents with    Asthma     asthma inhalers at home not working. HISTORY OF PRESENT ILLNESS   (Location, Timing/Onset, Context/Setting, Quality, Duration, Modifying Factors, Severity, Associated Signs and Symptoms)  Note limiting factors. William Briseno is a 9 y.o. male presents to the emergency department with complaint of asthma exacerbation without relief of symptoms with home medication. Mom reports that she is used his albuterol inhaler 4 times without relief, the child does have a runny nose and moist cough. No mitigating exacerbating factors. No recent travel or sick exposure. Child is awake and alert, no acute distress. He does have increased work of breathing with tachypnea but is not hypoxic. Nursing Notes were all reviewed and agreed with or any disagreements were addressed in the HPI. REVIEW OF SYSTEMS    (2-9 systems for level 4, 10 or more for level 5)     Review of Systems   Constitutional: Negative for chills and fever. Respiratory: Positive for cough, chest tightness, shortness of breath and wheezing. Cardiovascular: Negative for chest pain. Gastrointestinal: Negative for diarrhea, nausea and vomiting. Genitourinary: Negative for dysuria. All other systems reviewed and are negative. Positives and Pertinent negatives as per HPI. Except as noted above in the ROS, all other systems were reviewed and negative. PAST MEDICAL HISTORY   History reviewed. No pertinent past medical history. SURGICAL HISTORY   History reviewed.  No pertinent surgical Requesting Montelukast 10mg  LOV: 4/16/24  RTC: prn  Last ACT/AAP: never completed  Filled: 8/22/24 #90 with 0 refills    Future Appointments   Date Time Provider Department Center   3/26/2025 11:00 AM Pippa Edgar, ATA [facility] [address]     Asthma & COPD Medication Protocol [id number]/22/2024 05:05 PM   Protocol Details   ACT Score greater than or equal to 20    Appointment in past 6 or next 3 months    ACT recorded in the last 12 months     Patient with no history of asthma.  Dx allergic rhinitis  Rx sent   history. [address]       Discharge Medication List as of 1/17/2021  2:47 AM      CONTINUE these medications which have NOT CHANGED    Details   albuterol sulfate HFA (VENTOLIN HFA) 108 (90 Base) MCG/ACT inhaler Inhale 2 puffs into the lungs every 6 hours as needed for [address]     Patient has no known allergies. FAMILYHISTORY     History reviewed. No pertinent family history. SOCIAL HISTORY       Social History     Tobacco Use    Smoking status: Never Smoker    Smokeless tobacco: Never Used   Substance Use Topics    Alcohol use: Not on file    Drug use: Not on file       SCREENINGS             PHYSICAL EXAM    (up to 7 for level 4, 8 or more for level 5)     ED Triage Vitals   BP Temp Temp Source Heart Rate Resp SpO2 Height Weight - Scale   -- 01/17/21 0035 01/17/21 0058 01/17/21 0035 01/17/21 0035 01/17/21 0035 -- 01/17/21 0035    97.3 °F (36.3 °C) Oral 136 (!) 48 94 %  42 lb 4.8 oz (19.2 kg)       Physical Exam  Vitals signs and nursing note reviewed. Constitutional:       General: He is active. He is not in acute distress. Appearance: He is well-developed. HENT:      Nose: Rhinorrhea present. Eyes:      General:         Right eye: No discharge. Left eye: No discharge. Neck:      Musculoskeletal: Normal range of motion and neck supple. Cardiovascular:      Rate and Rhythm: Tachycardia present. Pulses: Normal pulses. Heart sounds: Normal heart sounds. Pulmonary:      Effort: Tachypnea present. No respiratory distress. Breath sounds: Wheezing present. Abdominal:      Palpations: Abdomen is soft. Musculoskeletal: Normal range of motion. Skin:     General: Skin is dry. Coloration: Skin is not pale. Neurological:      Mental Status: He is alert.          DIAGNOSTIC RESULTS   LABS:    Labs Reviewed   RSV RAPID ANTIGEN    Narrative:     Performed at:  [facility]  [place] [address]   [address], [address]   Phone [phone]   RAPID INFLUENZA A/B ANTIGENS    Narrative:     Performed at:  [facility]  [address]   Phone [phone]       All other labs were within normal range or not returned as of this dictation. EKG: All EKG's are interpreted by the Emergency Department Physician in the absence of a cardiologist.  Please see their note for interpretation of EKG. RADIOLOGY:   Non-plain film images such as CT, Ultrasound and MRI are read by the radiologist. Plain radiographic images are visualized and preliminarily interpreted by the ED Provider with the below findings:        Interpretation per the Radiologist below, if available at the time of this note:    XR CHEST PORTABLE   Final Result   Right perihilar opacity likely represents pneumonia. Xr Chest Portable    Result Date: 1/17/2021  EXAMINATION: ONE XRAY VIEW OF THE CHEST 1/17/2021 12:58 am COMPARISON: 11/09/2020 HISTORY: ORDERING SYSTEM PROVIDED HISTORY: SOB TECHNOLOGIST PROVIDED HISTORY: Reason for exam:->SOB FINDINGS: Right perihilar airspace opacity is identified. The left lung is clear. The cardiomediastinal silhouette is normal.  There is no discernible pneumothorax. Right perihilar opacity likely represents pneumonia.            PROCEDURES   Unless otherwise noted below, none     Procedures    CRITICAL CARE TIME   N/A    CONSULTS:  None      EMERGENCY DEPARTMENT COURSE and DIFFERENTIAL DIAGNOSIS/MDM:   Vitals:    Vitals:    01/17/21 0035 01/17/21 0058 01/17/21 0116 01/17/21 0159   Pulse: 136 134     Resp: (!) 48 (!) 48 (!) 35    Temp: 97.3 °F (36.3 °C) 99.4 °F (37.4 °C)     TempSrc:  Oral     SpO2: 94% 96% 95% 97%   Weight: 42 lb 4.8 oz (19.2 kg)          Patient was given the following medications:  Medications   albuterol (PROVENTIL) nebulizer solution 2.9 mg (2.9 mg Inhalation Given 1/17/21 0116)   amoxicillin (AMOXIL) 250 MG/5ML suspension 770 mg (770 mg Oral Given 1/17/21 0201)   prednisoLONE (PRELONE) 15 MG/5ML syrup 19.2 mg (19.2 mg Oral Given 1/17/21 0253)           Briefly, this is a 9year old male that presents to the emergency department with complaint of asthma exacerbation without relief of symptoms with home medication. Mom reports that she is used his albuterol inhaler 4 times without relief, the child does have a runny nose and moist cough. No mitigating exacerbating factors. No recent travel or sick exposure. XR CHEST PORTABLE (Final result)  Result time 01/17/21 01:19:58  Final result by Ike Curling, MD (01/17/21 01:19:58)                Impression:    Right perihilar opacity likely represents pneumonia. rvs and influenza negative. Covid swab is pending. Albuterol treatment given, wheezing relieved. The patient was given Prelone and amoxicillin for concern of wheezing with pneumonia. He was evaluated in the emergency department for multiple hours and did improve. Mom would like to be discharged home. He is tolerating p.o. intake. The patient has pneumonia, but does not have HYPOXIA, TACHYCARDIA, TACHYPNEA, VOMITING, SIGNIFICANT CO-MORBIDITIES, TOXICITY, OR SEVERE SEPSIS, thus I consider the discharge disposition reasonable. FINAL IMPRESSION      1.  Community acquired pneumonia of right lung, unspecified part of lung          DISPOSITION/PLAN   DISPOSITION Decision To Discharge 01/17/2021 02:35:26 AM      PATIENT REFERREDTO:  [address] [place] [address] MD [facility]  [facility]  [facility] 32  [phone]    Schedule an appointment as soon as possible for a visit         DISCHARGE MEDICATIONS:  Discharge Medication List as of 1/17/2021  2:47 AM      START taking these medications    Details   prednisoLONE (PRELONE) 15 MG/5ML syrup Take 6.4 mLs by mouth daily for 7 days, Disp-44.8 mL, R-0Print      amoxicillin (AMOXIL) 400 MG/5ML suspension Take 10.8 mLs by mouth 2 times daily for 10 days, Disp-216 mL, R-0Print             DISCONTINUED MEDICATIONS:  Discharge Medication List as of 1/17/2021  2:47 AM                 (Please note that portions of this note were completed with a voice recognition program.  Efforts were made to edit the dictations but occasionally words are mis-transcribed.)    JUANIS Gomes CNP (electronically signed)           JUANIS Gomes CNP  01/17/21 0300

## 2024-12-23 ENCOUNTER — PATIENT MESSAGE (OUTPATIENT)
Facility: CLINIC | Age: 39
End: 2024-12-23

## 2024-12-24 NOTE — TELEPHONE ENCOUNTER
Approved    Prior authorization approved  Payer: [facility] Case ID: [id number]    [phone]  Note from payer: The case has been Approved from  [id number]  Approval Details    Authorized from November 23, 2024 to December 23, 2025  Electronic appeal: Not supported  View History

## 2025-02-24 RX ORDER — MONTELUKAST SODIUM 10 MG/1
10 TABLET ORAL NIGHTLY
Qty: 90 TABLET | Refills: 0 | Status: SHIPPED | OUTPATIENT
Start: 2025-02-24

## 2025-02-24 NOTE — TELEPHONE ENCOUNTER
Requesting Montelukast 10mg  LOV: 4/16/24 Physical  RTC: not noted  Last Relevant Labs:   Filled: 11/26/24 #90 with 0 refills    Future Appointments   Date Time Provider Department Center   3/26/2025 11:00 AM Pippa Edgar PA-C [facility] [address]     Asthma & COPD Medication Protocol [id number]/24/2025 03:27 PM   Protocol Details   ACT Score greater than or equal to 20    Appointment in past 6 or next 3 months    ACT recorded in the last 12 months    Medication is active on med list     No history of asthma, patient takes Rx for allergic rhinitis    Rx sent to pharmacy per protocol

## 2025-03-26 ENCOUNTER — OFFICE VISIT (OUTPATIENT)
Facility: CLINIC | Age: 40
End: 2025-03-26
Payer: COMMERCIAL

## 2025-03-26 VITALS
RESPIRATION RATE: 18 BRPM | SYSTOLIC BLOOD PRESSURE: 126 MMHG | HEIGHT: 64 IN | OXYGEN SATURATION: 96 % | WEIGHT: 194 LBS | DIASTOLIC BLOOD PRESSURE: 84 MMHG | BODY MASS INDEX: 33.12 KG/M2 | HEART RATE: 96 BPM

## 2025-03-26 DIAGNOSIS — E88.819 INSULIN RESISTANCE: ICD-10-CM

## 2025-03-26 DIAGNOSIS — E66.811 CLASS 1 OBESITY WITH SERIOUS COMORBIDITY AND BODY MASS INDEX (BMI) OF 33.0 TO 33.9 IN ADULT, UNSPECIFIED OBESITY TYPE: ICD-10-CM

## 2025-03-26 DIAGNOSIS — Z51.81 ENCOUNTER FOR THERAPEUTIC DRUG LEVEL MONITORING: Primary | ICD-10-CM

## 2025-03-26 DIAGNOSIS — E78.1 HYPERTRIGLYCERIDEMIA: ICD-10-CM

## 2025-03-26 DIAGNOSIS — E78.2 MIXED HYPERLIPIDEMIA: ICD-10-CM

## 2025-03-26 DIAGNOSIS — E55.9 VITAMIN D DEFICIENCY: ICD-10-CM

## 2025-03-26 PROCEDURE — 99214 OFFICE O/P EST MOD 30 MIN: CPT | Performed by: PHYSICIAN ASSISTANT

## 2025-03-26 PROCEDURE — 3079F DIAST BP 80-89 MM HG: CPT | Performed by: PHYSICIAN ASSISTANT

## 2025-03-26 PROCEDURE — 3008F BODY MASS INDEX DOCD: CPT | Performed by: PHYSICIAN ASSISTANT

## 2025-03-26 PROCEDURE — 3074F SYST BP LT 130 MM HG: CPT | Performed by: PHYSICIAN ASSISTANT

## 2025-03-26 RX ORDER — TIRZEPATIDE 15 MG/.5ML
15 INJECTION, SOLUTION SUBCUTANEOUS WEEKLY
Qty: 6 ML | Refills: 1 | Status: SHIPPED | OUTPATIENT
Start: 2025-03-26

## 2025-03-26 RX ORDER — METFORMIN HYDROCHLORIDE 750 MG/1
750 TABLET, EXTENDED RELEASE ORAL
Qty: 90 TABLET | Refills: 1 | Status: SHIPPED | OUTPATIENT
Start: 2025-03-26

## 2025-03-26 NOTE — PROGRESS NOTES
HISTORY OF PRESENT ILLNESS  Chief Complaint   Patient presents with    Weight Check     -25lbs ( f/u 11/13/24 - 219)       Soo Pollack is a 39 year old female here for follow up in medical weight loss program.   -25lbs  Compliant on zepbound and metformin  Denies chest pain, shortness of breath, dizziness, blurred vision, headache, paresthesia, nausea/vomiting.   Doing well on medication, feeling well, no side effects  Prioritizing protein    Exercise/Activity: admits not as great lately, work has picked up and hasn't had the time  Nutrition: 24 hour food log reviewed, eating regular meals, +protein  Stress: 7/10 - feels like managing well, higher due to work  Sleep: 5 hours/night, light sleeper    [facility] Follow Up    General Information  Success Moment: I am down below 200lbs  Challenging Moment: Exercise has been a challenge - work stress  Nutrition Recall  Exercise   Patient stated average level of stress: 7  Sleep   Patient stated # hours uninterrupted sleep: 5   Patient stated feels   restful: Yes      Cause of disruption of sleep: Kids, light sleeper              Wt Readings from Last 6 Encounters:   03/26/25 194 lb (88 kg)   11/13/24 219 lb (99.3 kg)   06/27/24 261 lb (118.4 kg)   05/07/24 270 lb (122.5 kg)   04/16/24 285 lb 9.6 oz (129.5 kg)   03/07/24 293 lb (132.9 kg)            Breakfast Lunch Dinner Snacks Fluids   Reviewed           REVIEW OF SYSTEMS  GENERAL HEALTH: feels well otherwise, denied any fevers chills or night sweats   RESPIRATORY: denies shortness of breath   CARDIOVASCULAR: denies chest pain  GI: denies abdominal pain    EXAM  /84   Pulse 96   Resp 18   Ht 5' 4\" (1.626 m)   Wt 194 lb (88 kg)   LMP 05/03/2024 (Exact Date)   SpO2 96%   BMI 33.30 kg/m²   GENERAL: well developed, well nourished,in no apparent distress, A/O x3  SKIN: no rashes,no suspicious lesions  HEENT: atraumatic, normocephalic, OP-clear, PERRL  NECK: supple,no adenopathy  LUNGS: clear to auscultation  bilaterally   CARDIO: RRR without murmur  GI: good BS's,NT/ND, no masses or HSM  EXTREMITIES: no cyanosis, no clubbing, no edema    Lab Results   Component Value Date    WBC 7.9 04/17/2024    RBC 4.80 04/17/2024    HGB 14.0 04/17/2024    HCT 42.3 04/17/2024    MCV 88.1 04/17/2024    MCH 29.2 04/17/2024    MCHC 33.1 04/17/2024    RDW 12.3 04/17/2024    .0 04/17/2024     Lab Results   Component Value Date    GLU 91 04/17/2024    BUN 11 04/17/2024    CREATSERUM 0.95 04/17/2024    ANIONGAP 6 04/17/2024    CA 9.4 04/17/2024    OSMOCALC 291 04/17/2024    ALKPHO 37 04/17/2024    AST 14 (L) 04/17/2024    ALT 24 04/17/2024    BILT 0.3 04/17/2024    TP 7.4 04/17/2024    ALB 3.6 04/17/2024    GLOBULIN 3.8 04/17/2024     04/17/2024    K 4.1 04/17/2024     04/17/2024    CO2 23.0 04/17/2024     Lab Results   Component Value Date     04/03/2023    A1C 5.5 04/03/2023     Lab Results   Component Value Date    CHOLEST 214 (H) 04/17/2024    TRIG 139 04/17/2024    HDL 39 (L) 04/17/2024     (H) 04/17/2024    VLDL 26 04/17/2024    NONHDLC 175 (H) 04/17/2024     Lab Results   Component Value Date    TSH 2.450 04/17/2024     Lab Results   Component Value Date    B12 617 04/17/2024     Lab Results   Component Value Date    VITD 44.1 04/17/2024       Medications Ordered Prior to Encounter[1]    ASSESSMENT  Analyzed weight data:  Weight Calculations  Initial Weight: 347 lbs  Initial Weight Date: 08/10/23  Today's Weight: 194 lbs  5% Goal: 17.35  10% Goal: 34.7  Total Weight Loss: 153 lbs    Diagnoses and all orders for this visit:    Encounter for therapeutic drug level monitoring  -     metFORMIN  MG Oral Tablet 24 Hr; Take 1 tablet (750 mg total) by mouth daily with breakfast.  -     ZEPBOUND 15 MG/0.5ML Subcutaneous Solution Auto-injector; Inject 15 mg into the skin once a week.    Class 1 obesity with serious comorbidity and body mass index (BMI) of 33.0 to 33.9 in adult, unspecified obesity type  -      metFORMIN  MG Oral Tablet 24 Hr; Take 1 tablet (750 mg total) by mouth daily with breakfast.  -     ZEPBOUND 15 MG/0.5ML Subcutaneous Solution Auto-injector; Inject 15 mg into the skin once a week.    Mixed hyperlipidemia  -     metFORMIN  MG Oral Tablet 24 Hr; Take 1 tablet (750 mg total) by mouth daily with breakfast.  -     ZEPBOUND 15 MG/0.5ML Subcutaneous Solution Auto-injector; Inject 15 mg into the skin once a week.    Insulin resistance  -     metFORMIN  MG Oral Tablet 24 Hr; Take 1 tablet (750 mg total) by mouth daily with breakfast.  -     ZEPBOUND 15 MG/0.5ML Subcutaneous Solution Auto-injector; Inject 15 mg into the skin once a week.    Hypertriglyceridemia  -     metFORMIN  MG Oral Tablet 24 Hr; Take 1 tablet (750 mg total) by mouth daily with breakfast.  -     ZEPBOUND 15 MG/0.5ML Subcutaneous Solution Auto-injector; Inject 15 mg into the skin once a week.    Vitamin D deficiency  -     metFORMIN  MG Oral Tablet 24 Hr; Take 1 tablet (750 mg total) by mouth daily with breakfast.  -     ZEPBOUND 15 MG/0.5ML Subcutaneous Solution Auto-injector; Inject 15 mg into the skin once a week.        PLAN  Initial Weight: 347 lbs  Initial Weight Date: 08/10/23  Today's Weight: 194 lbs  5% Goal: 17.35  10% Goal: 34.7  Total Weight Loss: -25lbs/Net loss 153 lbs    Will continue zepbound and metformin - advised side effects and adverse effects of medication   HLD - lifestyle changes  Insulin resistance - continue current medications, low carb diet  Hypertriglyceridemia - continue current medications, low carb diet  Vit D supplement, take with food  Consistency with logging foods   Incorporate strength/resistance training  Nutrition: low carb diet/ recommended to eat breakfast daily/ regular protein intake  Medication use and side effects reviewed with patient.  Medication contraindications: EKG prior to stimulant  Follow up with dietitian and psychologist as recommended.  Discussed the  role of sleep and stress in weight management.  Counseled on comprehensive weight loss plan including attention to nutrition, exercise and behavior/stress management for success. See patient instruction below for more details.  Discussed strategies to overcome barriers to successful weight loss and weight maintenance  FITTE: ACSM recommendations (150-300 minutes/ week in active weight loss)   Weight Loss consent to treat reviewed and signed       NOTE TO PATIENT: The 21st Century Cures Act makes clinical notes like these available to patients in the interest of transparency. Clinical notes are medical documents used by physicians and care providers to communicate with each other. These documents include medical language and terminology, abbreviations, and treatment information that may sound technical and at times possibly unfamiliar. In addition, at times, the verbiage may appear blunt or direct. These documents are one tool providers use to communicate relevant information and clinical opinions of the care providers in a way that allows common understanding of the clinical context.     There are no Patient Instructions on file for this visit.    No follow-ups on file.    Patient verbalizes understanding.    Pippa Edgar PA-C  3/26/2025           [1]   Current Outpatient Medications on File Prior to Visit   Medication Sig Dispense Refill    montelukast 10 MG Oral Tab Take 1 tablet (10 mg total) by mouth nightly. 90 tablet 0    Biotin w/ Vitamins C & E (HAIR/SKIN/NAILS OR) Take by mouth.      cholecalciferol 50 MCG (2000 UT) Oral Cap       omega-3 fatty acids 1000 MG Oral Cap       Multiple Vitamins-Minerals (MULTI-VITAMIN/MINERALS) Oral Tab Take 1 tablet by mouth daily.      albuterol 108 (90 Base) MCG/ACT Inhalation Aero Soln Inhale 2 puffs into the lungs every 6 (six) hours as needed for Wheezing.      Levonorgest-Eth Estrad 91-Day 0.15-0.03 MG Oral Tab Take 1 tablet by mouth daily. 91 tablet 4    loratadine  (CLARITIN) 10 MG Oral Tab        No current facility-administered medications on file prior to visit.

## 2025-05-26 DIAGNOSIS — E28.39 PRIMARY OVARIAN INSUFFICIENCY: ICD-10-CM

## 2025-05-28 RX ORDER — MONTELUKAST SODIUM 10 MG/1
10 TABLET ORAL NIGHTLY
Qty: 90 TABLET | Refills: 0 | Status: SHIPPED | OUTPATIENT
Start: 2025-05-28

## 2025-05-28 RX ORDER — LEVONORGESTREL AND ETHINYL ESTRADIOL 0.15-0.03
1 KIT ORAL DAILY
Qty: 91 TABLET | Refills: 0 | Status: SHIPPED | OUTPATIENT
Start: 2025-05-28 | End: 2026-05-28

## 2025-05-28 NOTE — TELEPHONE ENCOUNTER
Requesting Montelukast 10mg  LOV: 4/16/24 Physical  RTC: prn  Last Relevant Labs:   Filled: 2/24/25 #90 with 0 refills    Future Appointments   Date Time Provider Department Center   6/9/2025  1:00 PM Rolanda Alvarez DO [facility] [address] [facility] [address]   7/17/2025  2:45 PM Kimberly Cabrera APN [facility] [address]   8/25/2025 11:00 AM Pippa Edgar PA-C [id number] [address]     Asthma & COPD Medication Protocol [id number]/26/2025 08:14 PM   Protocol Details   ACT Score greater than or equal to 20    Appointment in past 6 or next 3 months    ACT recorded in the last 12 months    Medication is active on med list       No history of asthma. Patient takes for allergic rhinitis    Rx sent to pharmacy per protocol

## 2025-05-28 NOTE — TELEPHONE ENCOUNTER
Last OV: 5/7/24 TIARA Arguelles  Last Refill Date: 1/31/24 #91 4 refills     Levonorgest-Eth Estrad 91-Day 0.15-0.03 MG Oral Tab 91 tablet 4 1/31/2024 1/30/2025    Sig - Route: Take 1 tablet by mouth daily. - Oral    Sent to pharmacy as: Levonorgest-Eth Estrad 91-Day 0.15-0.03 MG Oral Tablet ([place])    E-Prescribing Status: Receipt confirmed by pharmacy (1/31/2024  8:22 AM CST)      Follow Up:  1 year 5/2025  Next Appt. 7/17/25 TIARA Madina     Refill sent until appointment.

## 2025-05-29 ENCOUNTER — TELEPHONE (OUTPATIENT)
Dept: FAMILY MEDICINE CLINIC | Facility: CLINIC | Age: 40
End: 2025-05-29

## 2025-05-29 DIAGNOSIS — Z00.00 WELL ADULT EXAM: Primary | ICD-10-CM

## 2025-06-04 ENCOUNTER — LAB ENCOUNTER (OUTPATIENT)
Dept: LAB | Age: 40
End: 2025-06-04
Attending: FAMILY MEDICINE
Payer: COMMERCIAL

## 2025-06-04 DIAGNOSIS — Z00.00 WELL ADULT EXAM: ICD-10-CM

## 2025-06-04 LAB
ALBUMIN SERPL-MCNC: 4.2 G/DL (ref 3.2–4.8)
ALBUMIN/GLOB SERPL: 1.8 {RATIO} (ref 1–2)
ALP LIVER SERPL-CCNC: 27 U/L (ref 37–98)
ALT SERPL-CCNC: 10 U/L (ref 10–49)
ANION GAP SERPL CALC-SCNC: 11 MMOL/L (ref 0–18)
AST SERPL-CCNC: 12 U/L (ref ?–34)
BASOPHILS # BLD AUTO: 0.06 X10(3) UL (ref 0–0.2)
BASOPHILS NFR BLD AUTO: 0.9 %
BILIRUB SERPL-MCNC: 0.5 MG/DL (ref 0.3–1.2)
BUN BLD-MCNC: 8 MG/DL (ref 9–23)
CALCIUM BLD-MCNC: 9.2 MG/DL (ref 8.7–10.6)
CHLORIDE SERPL-SCNC: 105 MMOL/L (ref 98–112)
CHOLEST SERPL-MCNC: 177 MG/DL (ref ?–200)
CO2 SERPL-SCNC: 26 MMOL/L (ref 21–32)
CREAT BLD-MCNC: 0.94 MG/DL (ref 0.55–1.02)
EGFRCR SERPLBLD CKD-EPI 2021: 78 ML/MIN/1.73M2 (ref 60–?)
EOSINOPHIL # BLD AUTO: 0.34 X10(3) UL (ref 0–0.7)
EOSINOPHIL NFR BLD AUTO: 5.1 %
ERYTHROCYTE [DISTWIDTH] IN BLOOD BY AUTOMATED COUNT: 12.4 %
FASTING PATIENT LIPID ANSWER: YES
FASTING STATUS PATIENT QL REPORTED: YES
GLOBULIN PLAS-MCNC: 2.3 G/DL (ref 2–3.5)
GLUCOSE BLD-MCNC: 83 MG/DL (ref 70–99)
HCT VFR BLD AUTO: 40.2 % (ref 35–48)
HDLC SERPL-MCNC: 46 MG/DL (ref 40–59)
HGB BLD-MCNC: 13.5 G/DL (ref 12–16)
IMM GRANULOCYTES # BLD AUTO: 0.02 X10(3) UL (ref 0–1)
IMM GRANULOCYTES NFR BLD: 0.3 %
LDLC SERPL CALC-MCNC: 109 MG/DL (ref ?–100)
LYMPHOCYTES # BLD AUTO: 3.24 X10(3) UL (ref 1–4)
LYMPHOCYTES NFR BLD AUTO: 48.9 %
MCH RBC QN AUTO: 30.1 PG (ref 26–34)
MCHC RBC AUTO-ENTMCNC: 33.6 G/DL (ref 31–37)
MCV RBC AUTO: 89.7 FL (ref 80–100)
MONOCYTES # BLD AUTO: 0.44 X10(3) UL (ref 0.1–1)
MONOCYTES NFR BLD AUTO: 6.6 %
NEUTROPHILS # BLD AUTO: 2.53 X10 (3) UL (ref 1.5–7.7)
NEUTROPHILS # BLD AUTO: 2.53 X10(3) UL (ref 1.5–7.7)
NEUTROPHILS NFR BLD AUTO: 38.2 %
NONHDLC SERPL-MCNC: 131 MG/DL (ref ?–130)
OSMOLALITY SERPL CALC.SUM OF ELEC: 291 MOSM/KG (ref 275–295)
PLATELET # BLD AUTO: 270 10(3)UL (ref 150–450)
POTASSIUM SERPL-SCNC: 4 MMOL/L (ref 3.5–5.1)
PROT SERPL-MCNC: 6.5 G/DL (ref 5.7–8.2)
RBC # BLD AUTO: 4.48 X10(6)UL (ref 3.8–5.3)
SODIUM SERPL-SCNC: 142 MMOL/L (ref 136–145)
TRIGL SERPL-MCNC: 123 MG/DL (ref 30–149)
VLDLC SERPL CALC-MCNC: 21 MG/DL (ref 0–30)
WBC # BLD AUTO: 6.6 X10(3) UL (ref 4–11)

## 2025-06-04 PROCEDURE — 80053 COMPREHEN METABOLIC PANEL: CPT

## 2025-06-04 PROCEDURE — 36415 COLL VENOUS BLD VENIPUNCTURE: CPT

## 2025-06-04 PROCEDURE — 80061 LIPID PANEL: CPT

## 2025-06-04 PROCEDURE — 85025 COMPLETE CBC W/AUTO DIFF WBC: CPT

## 2025-06-09 ENCOUNTER — OFFICE VISIT (OUTPATIENT)
Dept: FAMILY MEDICINE CLINIC | Facility: CLINIC | Age: 40
End: 2025-06-09
Payer: COMMERCIAL

## 2025-06-09 VITALS
BODY MASS INDEX: 30.05 KG/M2 | SYSTOLIC BLOOD PRESSURE: 112 MMHG | HEART RATE: 84 BPM | TEMPERATURE: 97 F | WEIGHT: 176 LBS | DIASTOLIC BLOOD PRESSURE: 70 MMHG | OXYGEN SATURATION: 99 % | RESPIRATION RATE: 16 BRPM | HEIGHT: 64 IN

## 2025-06-09 DIAGNOSIS — Z12.31 BREAST CANCER SCREENING BY MAMMOGRAM: ICD-10-CM

## 2025-06-09 DIAGNOSIS — Z00.00 WELL ADULT EXAM: Primary | ICD-10-CM

## 2025-06-09 DIAGNOSIS — J45.20 MILD INTERMITTENT ASTHMA WITHOUT COMPLICATION (HCC): ICD-10-CM

## 2025-06-09 RX ORDER — LEVOCETIRIZINE DIHYDROCHLORIDE 5 MG/1
5 TABLET, FILM COATED ORAL EVERY EVENING
COMMUNITY

## 2025-06-09 RX ORDER — MONTELUKAST SODIUM 10 MG/1
10 TABLET ORAL NIGHTLY
Qty: 90 TABLET | Refills: 3 | Status: SHIPPED | OUTPATIENT
Start: 2025-06-09

## 2025-06-09 NOTE — PROGRESS NOTES
Subjective:   Soo Pollack is a 39 year old female who presents for Well Adult       History/Other:   History of Present Illness    Soo Pollack is a 39 year old female who presents for medication management and follow-up.    She is currently managing her asthma with Singulair, which has been effective. She uses albuterol infrequently, receiving refills from her allergy doctor, and takes Xyzal daily for allergy management. Her asthma symptoms are well-controlled, as indicated by the infrequent use of albuterol.    She entered menopause approximately two years ago, with complete cessation of menses last year. Symptoms such as vaginal dryness, irritability, and hot flashes are alleviated by her current oral contraceptive pill regimen prescribed by her gynecologist. There is no clear family history of menopause due to her mother's hysterectomy and lack of information from her grandmothers. Her sister had difficulty conceiving and required IVF--    She has been on a weight loss journey, having lost 110 pounds over the past year. She attributes this success to the use of Zepbound, which she receives through a weight loss clinic, and metformin 750 mg daily. She experiences vomiting if she consumes greasy foods or large meals.-she reports excess skin on her arms and stomach.    Her recent blood work showed triglycerides at 123 mg/dL and LDL cholesterol at 109 mg/dL. She follows a Mediterranean diet and walks two miles daily. Her vitamin D level was last recorded at 44 ng/mL. She is a nonsmoker.      History of asthma-taking montelukast.  Also taking albuterol as needed.    She is on OCP per gynecology.    No LMP recorded. (Menstrual status: Menopause).    Labs reviewed: Triglycerides 123, HDL 46, , CMP unremarkable, CBC normal, vitamin levels last year were normal.    Wt Readings from Last 6 Encounters:   06/09/25 176 lb (79.8 kg)   03/26/25 194 lb (88 kg)   11/13/24 219 lb (99.3 kg)   06/27/24 261 lb (118.4  kg)   24 270 lb (122.5 kg)   24 285 lb 9.6 oz (129.5 kg)         HISTORY:  Past Medical History:    Allergic rhinitis    I do see an allergist: Kylee Allergkatarina    Asthma (HCC)    Obesity      Past Surgical History:   Procedure Laterality Date          D & c      Russell teeth removed        Family History   Problem Relation Age of Onset    Hypertension Mother     Other (Other) Mother         epilipsy    Obesity Mother     Cancer Maternal Grandfather         Lung cancer    Obesity Maternal Grandmother     Obesity Paternal Grandmother       Social History     Socioeconomic History    Marital status:    Tobacco Use    Smoking status: Never    Smokeless tobacco: Never   Vaping Use    Vaping status: Never Used   Substance and Sexual Activity    Alcohol use: Yes     Comment: Social drinker only. Sometimes weeks go by    Drug use: Never    Sexual activity: Yes     Partners: Male   Other Topics Concern    Caffeine Concern No    Stress Concern No    Weight Concern No    Special Diet No    Exercise No    Seat Belt No         Chief Complaint Reviewed and Verified  No Further Nursing Notes to   Review  Tobacco Reviewed  Allergies Reviewed  Medications Reviewed    Problem List Reviewed  Medical History Reviewed  Surgical History   Reviewed  Family History Reviewed  Social History Reviewed         Tobacco:  She has never smoked tobacco.    Current Medications[1]    PHQ-2 SCORE: 0  , done 2025   Last [facility] Suicide Screening on 2025 was No Risk.       Review of Systems:  Pertinent items are noted in HPI.       Objective:   /70   Pulse 84   Temp 97.4 °F (36.3 °C) (Temporal)   Resp 16   Ht 5' 4\" (1.626 m)   Wt 176 lb (79.8 kg)   SpO2 99%   BMI 30.21 kg/m²  Estimated body mass index is 30.21 kg/m² as calculated from the following:    Height as of this encounter: 5' 4\" (1.626 m).    Weight as of this encounter: 176 lb (79.8 kg).  Results  LABS  Triglycerides: 123  mg/dL  LDL cholesterol: 109 mg/dL  Vitamin D: 44 ng/mL     Physical Exam  GENERAL: Alert, cooperative, well developed, no acute distress.  HEENT: Normocephalic, normal oropharynx, moist mucous membranes. Normal tm b/l.   CHEST: Clear to auscultation bilaterally, no wheezes, rhonchi, or crackles.  CARDIOVASCULAR: Normal heart rate and rhythm, S1 and S2 normal without murmurs.  ABDOMEN: Soft, non-tender, non-distended, without organomegaly, normal bowel sounds.  EXTREMITIES: No cyanosis or edema.  NEUROLOGICAL: Cranial nerves grossly intact, moves all extremities without gross motor or sensory deficit.      Assessment & Plan:   1. Well adult exam (Primary)  2. Breast cancer screening by mammogram  -     [facility] EZEQUIEL 2D+3D SCREENING BILAT (CPT=77067/91004); Future; Expected date: 2025  Other orders  -     Montelukast Sodium; Take 1 tablet (10 mg total) by mouth nightly.  Dispense: 90 tablet; Refill: 3    Assessment & Plan  Obesity  Significant weight loss of 110 pounds with Zepbound and metformin. Occasional vomiting with greasy or large meals. Loose skin on arms and stomach.  - Continue Zepbound and metformin regimen.  - Discuss potential for plastic surgery for excess skin if desired.  - Consult with weight loss clinic for further recommendations.    Menopause  Onset two years ago with symptoms managed by birth control pills. Early bone density screening planned due to early menopause.  - Continue current birth control regimen.  - Consider over-the-counter black cohosh for symptom relief.  - Plan for early bone density screening at age 43.  - Recommend weight-bearing exercises to prevent osteoporosis.  - Advise calcium intake of 1200 mg/day and vitamin D 7944-1207 IU/day.    Asthma  Well-controlled with current medication. Infrequent albuterol use indicates good control. Singulair effective.  - Refill Singulair for one year    - Ensure albuterol inhaler is not .    Hyperlipidemia  LDL slightly elevated at  109 mg/dL. Triglycerides at 123 mg/dL. No immediate need for medication. Emphasis on lifestyle modification.  - Monitor lipid levels annually.  - Recommend Mediterranean diet for heart health.  - Encourage regular exercise, including daily walking and weight-bearing exercises.    General Health Maintenance  Non-smoker with regular physical activity. Vitamin D level adequate. Mammogram ordered for routine screening.  - Complete mammogram screening.  - Continue regular physical activity.  - Maintain adequate vitamin D levels.        No follow-ups on file.        Rolanda Alvarez DO, 6/8/2025, 9:47 PM           The following individual(s) verbally consented to be recorded using ambient AI listening technology and understand that they can each withdraw their consent to this listening technology at any point by asking the clinician to turn off or pause the recording:    Patient name: Soo Pollack      Rolanda Alvarez DO        This note was prepared using Dragon Medical voice recognition dictation software. As a result errors may occur. When identified these errors have been corrected. While every attempt is made to correct errors during dictation discrepancies may still exist.      Note to patient: The 21st Century Cures Act makes medical notes like these available to patients in the interest of transparency. However, be advised this is a medical document. It is intended as peer to peer communication. It is written in medical language and may contain abbreviations or verbiage that are unfamiliar. It may appear blunt or direct. Medical documents are intended to carry relevant information, facts as evident, and the clinical opinion of the practitioner.               [1]   Current Outpatient Medications   Medication Sig Dispense Refill    levocetirizine 5 MG Oral Tab Take 1 tablet (5 mg total) by mouth every evening.      montelukast 10 MG Oral Tab Take 1 tablet (10 mg total) by mouth nightly. 90 tablet 3     Levonorgest-Eth Estrad 91-Day 0.15-0.03 MG Oral Tab Take 1 tablet by mouth daily. 91 tablet 0    metFORMIN  MG Oral Tablet 24 Hr Take 1 tablet (750 mg total) by mouth daily with breakfast. 90 tablet 1    ZEPBOUND 15 MG/0.5ML Subcutaneous Solution Auto-injector Inject 15 mg into the skin once a week. 6 mL 1    Biotin w/ Vitamins C & E (HAIR/SKIN/NAILS OR) Take by mouth.      cholecalciferol 50 MCG (2000 UT) Oral Cap       omega-3 fatty acids 1000 MG Oral Cap       Multiple Vitamins-Minerals (MULTI-VITAMIN/MINERALS) Oral Tab Take 1 tablet by mouth daily.      albuterol 108 (90 Base) MCG/ACT Inhalation Aero Soln Inhale 2 puffs into the lungs every 6 (six) hours as needed for Wheezing.

## 2025-06-10 NOTE — PATIENT INSTRUCTIONS
VISIT SUMMARY:  During your visit, we discussed the management of your asthma, menopause symptoms, weight loss journey, and cholesterol levels. Your asthma is well-controlled, and your menopause symptoms are being managed effectively. We also reviewed your significant weight loss and discussed the potential for plastic surgery for excess skin. Additionally, we talked about your cholesterol levels and general health maintenance.    YOUR PLAN:  -OBESITY: Obesity is a condition characterized by excessive body fat. You have successfully lost 110 pounds with the help of Zepbound and metformin. Continue with your current regimen. If you are interested in addressing the excess skin on your arms and stomach, we can discuss the potential for plastic surgery. Please consult with your weight loss clinic for further recommendations.    -MENOPAUSE: Menopause is the natural decline in reproductive hormones when a woman reaches her 40s or 50s. Your symptoms are currently managed by birth control pills. Continue with your current regimen. Consider using over-the-counter black cohosh for additional symptom relief. We plan to conduct an early bone density screening at age 43. To prevent osteoporosis, engage in weight-bearing exercises and ensure a daily intake of 1200 mg of calcium and 8275-1587 IU of vitamin D.    -ASTHMA: Asthma is a condition in which your airways narrow and swell, producing extra mucus. Your asthma is well-controlled with Singulair, and you use albuterol infrequently. We will refill your Singulair prescription for one year with a 90-day supply and auto refills. Please ensure your albuterol inhaler is not .    -HYPERLIPIDEMIA: Hyperlipidemia is the presence of high levels of fats (lipids) in the blood. Your LDL cholesterol is slightly elevated at 109 mg/dL, and your triglycerides are at 123 mg/dL. There is no immediate need for medication. We recommend continuing with a Mediterranean diet and regular  exercise, including daily walking and weight-bearing exercises. We will monitor your lipid levels annually.    -GENERAL HEALTH MAINTENANCE: You are a non-smoker and engage in regular physical activity. Your vitamin D level is adequate. We have ordered a mammogram for routine screening. Continue with your regular physical activity and maintain adequate vitamin D levels.    INSTRUCTIONS:  Please complete your mammogram screening as ordered. Continue with your current medications and lifestyle modifications. We will monitor your lipid levels annually and plan for an early bone density screening at age 43.    Contains text generated by Mary

## 2025-07-10 DIAGNOSIS — E78.1 HYPERTRIGLYCERIDEMIA: ICD-10-CM

## 2025-07-10 DIAGNOSIS — E88.819 INSULIN RESISTANCE: ICD-10-CM

## 2025-07-10 DIAGNOSIS — E66.811 CLASS 1 OBESITY WITH SERIOUS COMORBIDITY AND BODY MASS INDEX (BMI) OF 33.0 TO 33.9 IN ADULT, UNSPECIFIED OBESITY TYPE: ICD-10-CM

## 2025-07-10 DIAGNOSIS — E78.2 MIXED HYPERLIPIDEMIA: ICD-10-CM

## 2025-07-10 DIAGNOSIS — E55.9 VITAMIN D DEFICIENCY: ICD-10-CM

## 2025-07-10 DIAGNOSIS — Z51.81 ENCOUNTER FOR THERAPEUTIC DRUG LEVEL MONITORING: ICD-10-CM

## 2025-07-10 RX ORDER — TIRZEPATIDE 15 MG/.5ML
15 INJECTION, SOLUTION SUBCUTANEOUS WEEKLY
Qty: 6 ML | Refills: 1 | Status: CANCELLED | OUTPATIENT
Start: 2025-07-10

## 2025-07-17 ENCOUNTER — OFFICE VISIT (OUTPATIENT)
Dept: OBGYN CLINIC | Facility: CLINIC | Age: 40
End: 2025-07-17
Payer: COMMERCIAL

## 2025-07-17 VITALS
HEART RATE: 96 BPM | WEIGHT: 179.13 LBS | DIASTOLIC BLOOD PRESSURE: 72 MMHG | SYSTOLIC BLOOD PRESSURE: 118 MMHG | BODY MASS INDEX: 30.58 KG/M2 | HEIGHT: 64 IN

## 2025-07-17 DIAGNOSIS — E28.39 PRIMARY OVARIAN INSUFFICIENCY: ICD-10-CM

## 2025-07-17 DIAGNOSIS — Z01.419 WELL WOMAN EXAM WITH ROUTINE GYNECOLOGICAL EXAM: Primary | ICD-10-CM

## 2025-07-17 PROCEDURE — 3078F DIAST BP <80 MM HG: CPT | Performed by: NURSE PRACTITIONER

## 2025-07-17 PROCEDURE — 3074F SYST BP LT 130 MM HG: CPT | Performed by: NURSE PRACTITIONER

## 2025-07-17 PROCEDURE — 3008F BODY MASS INDEX DOCD: CPT | Performed by: NURSE PRACTITIONER

## 2025-07-17 PROCEDURE — 99396 PREV VISIT EST AGE 40-64: CPT | Performed by: NURSE PRACTITIONER

## 2025-07-17 RX ORDER — LEVONORGESTREL AND ETHINYL ESTRADIOL 0.15-0.03
1 KIT ORAL DAILY
Qty: 91 TABLET | Refills: 4 | Status: SHIPPED | OUTPATIENT
Start: 2025-07-17 | End: 2026-07-17

## 2025-07-17 NOTE — PROGRESS NOTES
Here for Routine Annual Exam  No concerns or questions.  Menses were absent the last placebo break in her OCP.She has a history or POF  Contraception- OCP.  She has a mammogram scheduled  She is OK to defer her pap    ROS: No Cardiac, Respiratory, GI,  or Neurological symptoms.    PE:  GENERAL: well developed, well nourished, in no apparent distress  SKIN: no rashes, no suspicious lesions  HEENT: normal  NECK: supple; no thyroidmegaly, no adenopathy  LUNGS: clear to auscultation  CARDIOVASCULAR: normal S1, S2, RRR  BREASTS: firm, nontendder, no palpable masses or nodes, no nipple discharge, no skin changes, no axillary adenopathy,    ABDOMEN: Soft, non distended; non tender, no masses  GYNE/: External Genitalia: Normal without lesions or erythema                      Vagina: normal without lesions, scant discharge                       Uterus: mid, mobile, non tender, normal size                     Cervix: no lesions or CMT                     Adnexa: non tender, no masses, normal size  EXTREMITIES:  non tender without edema    A/P:   1. Well woman exam with routine gynecological exam  Regular self breast exams recommended  Increase calcium intake and weight bearing exercises    2. Primary ovarian insufficiency  Advised on risk and danger signs for VTE  - Levonorgest-Eth Estrad 91-Day 0.15-0.03 MG Oral Tab; Take 1 tablet by mouth daily.  Dispense: 91 tablet; Refill: 4     Return to clinic 1 year for routine exam, or as needed with any concerns or question

## 2025-07-22 ENCOUNTER — HOSPITAL ENCOUNTER (OUTPATIENT)
Dept: MAMMOGRAPHY | Age: 40
Discharge: HOME | End: 2025-07-22
Attending: FAMILY MEDICINE
Payer: COMMERCIAL

## 2025-07-22 DIAGNOSIS — Z12.31 BREAST CANCER SCREENING BY MAMMOGRAM: ICD-10-CM

## 2025-07-22 PROCEDURE — 77067 SCR MAMMO BI INCL CAD: CPT | Performed by: FAMILY MEDICINE

## 2025-07-22 PROCEDURE — 77063 BREAST TOMOSYNTHESIS BI: CPT | Performed by: FAMILY MEDICINE

## 2025-08-15 ENCOUNTER — HOSPITAL ENCOUNTER (OUTPATIENT)
Dept: MAMMOGRAPHY | Age: 40
Discharge: HOME | End: 2025-08-15
Attending: FAMILY MEDICINE

## 2025-08-15 DIAGNOSIS — R92.2 INCONCLUSIVE MAMMOGRAM: ICD-10-CM

## 2025-08-15 PROCEDURE — 77061 BREAST TOMOSYNTHESIS UNI: CPT | Performed by: FAMILY MEDICINE

## 2025-08-15 PROCEDURE — 77065 DX MAMMO INCL CAD UNI: CPT | Performed by: FAMILY MEDICINE

## 2025-08-25 ENCOUNTER — OFFICE VISIT (OUTPATIENT)
Facility: CLINIC | Age: 40
End: 2025-08-25

## 2025-08-25 VITALS
BODY MASS INDEX: 29.37 KG/M2 | SYSTOLIC BLOOD PRESSURE: 120 MMHG | DIASTOLIC BLOOD PRESSURE: 76 MMHG | RESPIRATION RATE: 18 BRPM | HEIGHT: 64 IN | OXYGEN SATURATION: 99 % | HEART RATE: 84 BPM | WEIGHT: 172 LBS

## 2025-08-25 DIAGNOSIS — Z51.81 ENCOUNTER FOR THERAPEUTIC DRUG LEVEL MONITORING: Primary | ICD-10-CM

## 2025-08-25 DIAGNOSIS — E88.819 INSULIN RESISTANCE: ICD-10-CM

## 2025-08-25 DIAGNOSIS — E55.9 VITAMIN D DEFICIENCY: ICD-10-CM

## 2025-08-25 DIAGNOSIS — E66.3 OVERWEIGHT (BMI 25.0-29.9): ICD-10-CM

## 2025-08-25 DIAGNOSIS — E66.9 CHRONIC OBESITY NOT AFFECTING CURRENT EPISODE OF CARE: ICD-10-CM

## 2025-08-25 DIAGNOSIS — E78.2 MIXED HYPERLIPIDEMIA: ICD-10-CM

## 2025-08-25 DIAGNOSIS — E78.1 HYPERTRIGLYCERIDEMIA: ICD-10-CM

## 2025-08-25 PROCEDURE — 3074F SYST BP LT 130 MM HG: CPT | Performed by: PHYSICIAN ASSISTANT

## 2025-08-25 PROCEDURE — 3008F BODY MASS INDEX DOCD: CPT | Performed by: PHYSICIAN ASSISTANT

## 2025-08-25 PROCEDURE — 99214 OFFICE O/P EST MOD 30 MIN: CPT | Performed by: PHYSICIAN ASSISTANT

## 2025-08-25 PROCEDURE — 3078F DIAST BP <80 MM HG: CPT | Performed by: PHYSICIAN ASSISTANT

## 2025-08-25 RX ORDER — METFORMIN HYDROCHLORIDE 750 MG/1
750 TABLET, EXTENDED RELEASE ORAL
Qty: 90 TABLET | Refills: 1 | Status: SHIPPED | OUTPATIENT
Start: 2025-08-25

## 2025-08-25 RX ORDER — TIRZEPATIDE 15 MG/.5ML
15 INJECTION, SOLUTION SUBCUTANEOUS WEEKLY
Qty: 6 ML | Refills: 1 | Status: SHIPPED | OUTPATIENT
Start: 2025-08-25

## (undated) NOTE — LETTER
Soo Pollack, :10/23/1985    CONSENT FOR PROCEDURE/SEDATION    1. I authorize the performance upon Soo Pollack  the following: Insertion Intrauterine Device    2. I authorize DAPHNIE Herzog (and whomever is designated as the doctor’s assistant), to perform the above-mentioned procedures.    3. If any unforeseen conditions arise during this procedure calling for additional  procedures, operations, or medications (including anesthesia and blood transfusion), I further request and authorize the doctor to do whatever he/she deems advisable in my interest.    4. I consent to the taking and reproduction of any photographs in the course of this procedure for professional purposes.    5. I consent to the administration of such sedation as may be considered necessary or advisable by the physician responsible for this service, with the exception of ______________________________________________________    6. I have been informed by my doctor of the nature and purpose of this procedure sedation, possible alternative methods of treatment, risk involved and possible complications.    7. If I have a Do Not Resuscitate (DNR) order in place, the physician and I (or the individual authorized to consent on my behalf) will discuss and agree as to whether the Do Not Resuscitate (DNR) order will remain in effect or will be discontinued during the performance of the procedure and the applicable recovery period. If the Do Not Resuscitate (DNR) order is discontinued and is to be reinstated following the procedure/recovery period, the physician will determine when the applicable recovery period ends for purposes of reinstating the Do Not Resuscitate (DNR) order.    Signature of Patient:_______________________________________________    Signature of person authorized to consent for patient:  _______________________________________________________________    Relationship to patient:  ____________________________________________    Witness: _________________________________________ Date:___________     Physician Signature: _______________________________ Date:___________